# Patient Record
Sex: FEMALE | Race: WHITE | NOT HISPANIC OR LATINO | Employment: FULL TIME | URBAN - METROPOLITAN AREA
[De-identification: names, ages, dates, MRNs, and addresses within clinical notes are randomized per-mention and may not be internally consistent; named-entity substitution may affect disease eponyms.]

---

## 2017-02-24 ENCOUNTER — HOSPITAL ENCOUNTER (OUTPATIENT)
Dept: OTHER | Facility: HOSPITAL | Age: 62
Setting detail: SPECIMEN
Discharge: HOME | End: 2017-02-24
Attending: RADIOLOGY | Admitting: RADIOLOGY

## 2017-12-14 ENCOUNTER — ON CAMPUS - OUTPATIENT (AMBULATORY)
Dept: URBAN - METROPOLITAN AREA HOSPITAL 2 | Facility: HOSPITAL | Age: 62
End: 2017-12-14

## 2017-12-14 ENCOUNTER — OFFICE (AMBULATORY)
Dept: URBAN - METROPOLITAN AREA CLINIC 64 | Facility: CLINIC | Age: 62
End: 2017-12-14

## 2017-12-14 VITALS
SYSTOLIC BLOOD PRESSURE: 149 MMHG | HEART RATE: 81 BPM | HEART RATE: 93 BPM | HEART RATE: 80 BPM | DIASTOLIC BLOOD PRESSURE: 87 MMHG | SYSTOLIC BLOOD PRESSURE: 154 MMHG | DIASTOLIC BLOOD PRESSURE: 107 MMHG | DIASTOLIC BLOOD PRESSURE: 75 MMHG | DIASTOLIC BLOOD PRESSURE: 69 MMHG | OXYGEN SATURATION: 100 % | SYSTOLIC BLOOD PRESSURE: 173 MMHG | DIASTOLIC BLOOD PRESSURE: 46 MMHG | HEART RATE: 94 BPM | TEMPERATURE: 97.5 F | HEART RATE: 77 BPM | OXYGEN SATURATION: 96 % | RESPIRATION RATE: 16 BRPM | DIASTOLIC BLOOD PRESSURE: 68 MMHG | OXYGEN SATURATION: 98 % | RESPIRATION RATE: 19 BRPM | SYSTOLIC BLOOD PRESSURE: 145 MMHG | HEART RATE: 99 BPM | HEART RATE: 92 BPM | RESPIRATION RATE: 15 BRPM | SYSTOLIC BLOOD PRESSURE: 148 MMHG | OXYGEN SATURATION: 99 % | RESPIRATION RATE: 17 BRPM | DIASTOLIC BLOOD PRESSURE: 94 MMHG | RESPIRATION RATE: 18 BRPM | SYSTOLIC BLOOD PRESSURE: 161 MMHG | WEIGHT: 222 LBS | SYSTOLIC BLOOD PRESSURE: 146 MMHG | HEART RATE: 104 BPM

## 2017-12-14 DIAGNOSIS — K31.7 POLYP OF STOMACH AND DUODENUM: ICD-10-CM

## 2017-12-14 DIAGNOSIS — K92.0 HEMATEMESIS: ICD-10-CM

## 2017-12-14 PROBLEM — K59.1 DIARRHEA: Status: ACTIVE | Noted: 2017-12-14

## 2017-12-14 LAB
GI HISTOLOGY: A. UNSPECIFIED: (no result)
GI HISTOLOGY: B. SELECT: (no result)
GI HISTOLOGY: PDF REPORT: (no result)

## 2017-12-14 PROCEDURE — 88305 TISSUE EXAM BY PATHOLOGIST: CPT | Performed by: INTERNAL MEDICINE

## 2017-12-14 PROCEDURE — 43250 EGD CAUTERY TUMOR POLYP: CPT | Performed by: INTERNAL MEDICINE

## 2017-12-14 PROCEDURE — 43239 EGD BIOPSY SINGLE/MULTIPLE: CPT | Mod: 59 | Performed by: INTERNAL MEDICINE

## 2017-12-14 RX ADMIN — PROPOFOL: 10 INJECTION, EMULSION INTRAVENOUS at 15:09

## 2017-12-14 NOTE — SERVICEHPINOTES
CLAU UNDERWOOD  is a  62  female   who presents today for a  EGD   for   the indications listed below. The updated Patient Profile was reviewed prior to the procedure, in conjunction with the Physical Exam, including medical conditions, surgical procedures, medications, allergies, family history and social history. See Physical Exam time stamp below for date and time of HPI completion.Pre-operatively, I reviewed the indication(s) for the procedure, the risks of the procedure [including but not limited to: unexpected bleeding possibly requiring hospitalization and/or unplanned repeat procedures, perforation possibly requiring surgical treatment, missed lesions and complications of sedation/MAC (also explained by anesthesia staff)]. I have evaluated the patient for risks associated with the planned anesthesia and the procedure to be performed and find the patient an acceptable candidate for IV sedation.Multiple opportunities were provided for any questions or concerns, and all questions were answered satisfactorily before any anesthesia was administered. We will proceed with the planned procedure. BRa few weeks ago, had multiple episodes of hematemesis with with multiple bouts of vomiting. Has had chronic diarrhea for many months. BR

## 2017-12-14 NOTE — SERVICENOTES
bleeding was likely Rosetta wise tear from vomiting. Acute Vomiting episodes resolved and defect likely healed. Continue PPI.

## 2020-07-26 PROCEDURE — U0003 INFECTIOUS AGENT DETECTION BY NUCLEIC ACID (DNA OR RNA); SEVERE ACUTE RESPIRATORY SYNDROME CORONAVIRUS 2 (SARS-COV-2) (CORONAVIRUS DISEASE [COVID-19]), AMPLIFIED PROBE TECHNIQUE, MAKING USE OF HIGH THROUGHPUT TECHNOLOGIES AS DESCRIBED BY CMS-2020-01-R: HCPCS

## 2020-07-30 ENCOUNTER — TELEPHONE (OUTPATIENT)
Dept: URGENT CARE | Facility: CLINIC | Age: 65
End: 2020-07-30

## 2020-09-21 ENCOUNTER — OFFICE VISIT (OUTPATIENT)
Dept: SURGERY | Facility: CLINIC | Age: 65
End: 2020-09-21

## 2020-09-21 ENCOUNTER — PREP FOR SURGERY (OUTPATIENT)
Dept: OTHER | Facility: HOSPITAL | Age: 65
End: 2020-09-21

## 2020-09-21 VITALS
DIASTOLIC BLOOD PRESSURE: 70 MMHG | OXYGEN SATURATION: 97 % | HEIGHT: 60 IN | WEIGHT: 214 LBS | HEART RATE: 66 BPM | BODY MASS INDEX: 42.01 KG/M2 | TEMPERATURE: 97.3 F | SYSTOLIC BLOOD PRESSURE: 138 MMHG

## 2020-09-21 DIAGNOSIS — D17.1 LIPOMA OF BACK: Primary | ICD-10-CM

## 2020-09-21 PROCEDURE — 99203 OFFICE O/P NEW LOW 30 MIN: CPT | Performed by: SURGERY

## 2020-09-21 RX ORDER — SERTRALINE HYDROCHLORIDE 100 MG/1
100 TABLET, FILM COATED ORAL DAILY
COMMUNITY
Start: 2020-08-31

## 2020-09-21 RX ORDER — ATORVASTATIN CALCIUM 20 MG/1
20 TABLET, FILM COATED ORAL DAILY
COMMUNITY
Start: 2020-07-16

## 2020-09-21 RX ORDER — TRAMADOL HYDROCHLORIDE 50 MG/1
50 TABLET ORAL EVERY 8 HOURS PRN
COMMUNITY
Start: 2020-07-13

## 2020-09-21 RX ORDER — MELOXICAM 15 MG/1
15 TABLET ORAL DAILY
COMMUNITY
Start: 2020-09-10

## 2020-09-21 RX ORDER — DICYCLOMINE HYDROCHLORIDE 10 MG/1
10 CAPSULE ORAL NIGHTLY
COMMUNITY
Start: 2020-09-14

## 2020-09-21 NOTE — PROGRESS NOTES
"Subjective   Carito Anderson is a 65 y.o. female.   Chief Complaint   Patient presents with   • Mass     right shoulder     /70   Pulse 66   Temp 97.3 °F (36.3 °C) (Temporal)   Ht 152.4 cm (60\")   Wt 97.1 kg (214 lb)   SpO2 97%   BMI 41.79 kg/m²     HISTORY OF PRESENT ILLNESS:  Patient is a very pleasant 65-year-old female.  She noticed a new 2 and half centimeter lump on her right upper back near her shoulder that is quite tender.  She is not sure how long it is been there but she only noticed it when it was uncomfortable.  Is never had any lump like this before.  She does also report some weakness on her right side and was wondering if this was attributable.      The following portions of the patient's history were reviewed and updated as appropriate: allergies, current medications, past family history, past medical history, past social history, past surgical history and problem list.    Review of Systems   Musculoskeletal:        Tender nodule right upper back       Objective   Physical Exam  Constitutional:       Appearance: She is well-developed.   HENT:      Head: Normocephalic and atraumatic.   Neck:      Musculoskeletal: Normal range of motion.   Cardiovascular:      Rate and Rhythm: Normal rate.   Pulmonary:      Effort: Pulmonary effort is normal.   Abdominal:      Palpations: Abdomen is soft.   Musculoskeletal: Normal range of motion.      Comments: 2.5 cm palpable mass consistent with a lipoma right upper back near her shoulder   Skin:     General: Skin is warm and dry.   Neurological:      Mental Status: She is alert and oriented to person, place, and time.           Assessment/Plan   Carito was seen today for mass.    Diagnoses and all orders for this visit:    Lipoma of back    I do think she would benefit from excision secondary to the discomfort.  I think this can be done under MAC anesthesia would not require general.  We have discussed the risk of anesthesia bleeding and infection.  I " also told her that although this would result in localized tenderness that any weakness or other symptoms she is experiencing would not be attributable to this.    Beba Kat MD  9/21/2020  12:08 PM EDT

## 2020-09-21 NOTE — H&P
Subjective   Carito Anderson is a 65 y.o. female.   No chief complaint on file.    There were no vitals taken for this visit.    HISTORY OF PRESENT ILLNESS:  Patient is a very pleasant 65-year-old female.  She noticed a new 2 and half centimeter lump on her right upper back near her shoulder that is quite tender.  She is not sure how long it is been there but she only noticed it when it was uncomfortable.  Is never had any lump like this before.  She does also report some weakness on her right side and was wondering if this was attributable.      The following portions of the patient's history were reviewed and updated as appropriate: allergies, current medications, past family history, past medical history, past social history, past surgical history and problem list.    Review of Systems   Musculoskeletal:        Tender nodule right upper back       Objective   Physical Exam  Constitutional:       Appearance: She is well-developed.   HENT:      Head: Normocephalic and atraumatic.   Neck:      Musculoskeletal: Normal range of motion.   Cardiovascular:      Rate and Rhythm: Normal rate.   Pulmonary:      Effort: Pulmonary effort is normal.   Abdominal:      Palpations: Abdomen is soft.   Musculoskeletal: Normal range of motion.      Comments: 2.5 cm palpable mass consistent with a lipoma right upper back near her shoulder   Skin:     General: Skin is warm and dry.   Neurological:      Mental Status: She is alert and oriented to person, place, and time.           Assessment/Plan   There are no diagnoses linked to this encounter.I do think she would benefit from excision secondary to the discomfort.  I think this can be done under MAC anesthesia would not require general.  We have discussed the risk of anesthesia bleeding and infection.  I also told her that although this would result in localized tenderness that any weakness or other symptoms she is experiencing would not be attributable to this.    Beba Kat,  MD  9/21/2020  12:08 PM EDT

## 2020-09-28 ENCOUNTER — LAB (OUTPATIENT)
Dept: LAB | Facility: HOSPITAL | Age: 65
End: 2020-09-28

## 2020-09-28 ENCOUNTER — HOSPITAL ENCOUNTER (OUTPATIENT)
Dept: CARDIOLOGY | Facility: HOSPITAL | Age: 65
Discharge: HOME | End: 2020-09-28

## 2020-09-28 DIAGNOSIS — D17.1 LIPOMA OF BACK: ICD-10-CM

## 2020-09-28 LAB
DEPRECATED RDW RBC AUTO: 40.6 FL (ref 37–54)
ERYTHROCYTE [DISTWIDTH] IN BLOOD BY AUTOMATED COUNT: 13.5 % (ref 12.3–15.4)
HCT VFR BLD AUTO: 41 % (ref 34–46.6)
HGB BLD-MCNC: 13.4 G/DL (ref 12–15.9)
MCH RBC QN AUTO: 27.2 PG (ref 26.6–33)
MCHC RBC AUTO-ENTMCNC: 32.7 G/DL (ref 31.5–35.7)
MCV RBC AUTO: 83.3 FL (ref 79–97)
PLATELET # BLD AUTO: 252 10*3/MM3 (ref 140–450)
PMV BLD AUTO: 10.8 FL (ref 6–12)
RBC # BLD AUTO: 4.92 10*6/MM3 (ref 3.77–5.28)
WBC # BLD AUTO: 8.01 10*3/MM3 (ref 3.4–10.8)

## 2020-09-28 PROCEDURE — 85027 COMPLETE CBC AUTOMATED: CPT

## 2020-09-28 PROCEDURE — 93005 ELECTROCARDIOGRAM TRACING: CPT | Performed by: SURGERY

## 2020-09-28 PROCEDURE — 93010 ELECTROCARDIOGRAM REPORT: CPT | Performed by: INTERNAL MEDICINE

## 2020-12-30 ENCOUNTER — OFFICE VISIT (OUTPATIENT)
Dept: SURGERY | Facility: CLINIC | Age: 65
End: 2020-12-30

## 2020-12-30 ENCOUNTER — PREP FOR SURGERY (OUTPATIENT)
Dept: OTHER | Facility: HOSPITAL | Age: 65
End: 2020-12-30

## 2020-12-30 VITALS
HEART RATE: 80 BPM | HEIGHT: 60 IN | BODY MASS INDEX: 42.41 KG/M2 | WEIGHT: 216 LBS | OXYGEN SATURATION: 96 % | TEMPERATURE: 96.8 F

## 2020-12-30 DIAGNOSIS — D17.1 LIPOMA OF BACK: Primary | ICD-10-CM

## 2020-12-30 PROBLEM — K21.9 GASTROESOPHAGEAL REFLUX DISEASE: Status: ACTIVE | Noted: 2017-10-30

## 2020-12-30 PROBLEM — I10 HYPERTENSION: Status: ACTIVE | Noted: 2020-12-30

## 2020-12-30 PROCEDURE — 99213 OFFICE O/P EST LOW 20 MIN: CPT | Performed by: SURGERY

## 2020-12-30 RX ORDER — SODIUM CHLORIDE 9 MG/ML
100 INJECTION, SOLUTION INTRAVENOUS CONTINUOUS
Status: CANCELLED | OUTPATIENT
Start: 2020-12-30

## 2020-12-30 NOTE — H&P
Subjective   Carito Anderson is a 65 y.o. female.     History of present illness  Ms. Anderson is a pleasant 65-year-old female patient who was originally scheduled to have a lipoma removed by Dr. Valente.  Dr. Valente being gone been asked to see her.  She has about a 2-1/2 to 3 cm subcutaneous lipoma in the right upper back that is tender.  Is likely an angiolipoma.  We have discussed removal of that with her today.  She would like to proceed.  She is having some discomfort in the upper shoulder and it may even be pulling on the spinal accessory nerve.        Past Medical History:   Diagnosis Date   • Anxiety    • Arthritis    • Bowel incontinence    • Depression    • GERD (gastroesophageal reflux disease)    • Hx of migraines    • Hyperlipidemia    • Leg cramps    • Memory deficit        Past Surgical History:   Procedure Laterality Date   • APPENDECTOMY     • CHOLECYSTECTOMY     • HYSTERECTOMY     • KNEE SURGERY Right     arthritis   • ROTATOR CUFF REPAIR Right        Outpatient Encounter Medications as of 12/30/2020   Medication Sig Dispense Refill   • atorvastatin (LIPITOR) 20 MG tablet Take 20 mg by mouth Daily. Ok to take preop     • dicyclomine (BENTYL) 10 MG capsule Take 10 mg by mouth Daily. dont take preop     • meloxicam (MOBIC) 15 MG tablet Take 15 mg by mouth Daily. Last dose 9/28/20     • omeprazole (priLOSEC) 20 MG capsule Take 20 mg by mouth Daily. Take preop     • sertraline (ZOLOFT) 100 MG tablet Take 100 mg by mouth Daily. Take preop     • sertraline (ZOLOFT) 25 MG tablet Take 25 mg by mouth Daily.     • traMADol (ULTRAM) 50 MG tablet Take 50 mg by mouth Every 8 (Eight) Hours As Needed. Take preop op       No facility-administered encounter medications on file as of 12/30/2020.        No Known Allergies    No family history on file.    Social History     Socioeconomic History   • Marital status:      Spouse name: Not on file   • Number of children: Not on file   • Years of education: Not  on file   • Highest education level: Not on file   Tobacco Use   • Smoking status: Never Smoker   • Smokeless tobacco: Never Used   Substance and Sexual Activity   • Alcohol use: Not Currently   • Drug use: Never   • Sexual activity: Defer       The following portions of the patient's history were reviewed and updated as appropriate: allergies, current medications, past family history, past medical history, past social history, past surgical history and problem list.    Objective   Complete review of systems is done and unremarkable exception the chief complaint    Physical exam shows a pleasant obese 65-year-old female.  HEENT is negative.  Heart regular.  Lungs are clear.  Abdomen is soft obese nontender extremities show equal range of motion in the upper and lower extremities.  She has symmetrical strength and usage.  Neuro shows no obvious focal deficit.    She does have in the right upper back about 4 inches lateral to the midline a 2-1/2 x 3 cm subcutaneous mass consistent with a lipoma.    Impression: Painful lipoma right upper back    Plan: Surgical excision in the operating room    Assessment/Plan   There are no diagnoses linked to this encounter.               Kayden Goldsmith DO  12/30/2020  13:55 EST

## 2020-12-30 NOTE — PROGRESS NOTES
Subjective   Carito Anderson is a 65 y.o. female.     History of present illness  Ms. Anderson is a pleasant 65-year-old female patient who was originally scheduled to have a lipoma removed by Dr. Valente.  Dr. Valente being gone been asked to see her.  She has about a 2-1/2 to 3 cm subcutaneous lipoma in the right upper back that is tender.  Is likely an angiolipoma.  We have discussed removal of that with her today.  She would like to proceed.  She is having some discomfort in the upper shoulder and it may even be pulling on the spinal accessory nerve.        Past Medical History:   Diagnosis Date   • Anxiety    • Arthritis    • Bowel incontinence    • Depression    • GERD (gastroesophageal reflux disease)    • Hx of migraines    • Hyperlipidemia    • Leg cramps    • Memory deficit        Past Surgical History:   Procedure Laterality Date   • APPENDECTOMY     • CHOLECYSTECTOMY     • HYSTERECTOMY     • KNEE SURGERY Right     arthritis   • ROTATOR CUFF REPAIR Right        Outpatient Encounter Medications as of 12/30/2020   Medication Sig Dispense Refill   • atorvastatin (LIPITOR) 20 MG tablet Take 20 mg by mouth Daily. Ok to take preop     • dicyclomine (BENTYL) 10 MG capsule Take 10 mg by mouth Daily. dont take preop     • meloxicam (MOBIC) 15 MG tablet Take 15 mg by mouth Daily. Last dose 9/28/20     • omeprazole (priLOSEC) 20 MG capsule Take 20 mg by mouth Daily. Take preop     • sertraline (ZOLOFT) 100 MG tablet Take 100 mg by mouth Daily. Take preop     • sertraline (ZOLOFT) 25 MG tablet Take 25 mg by mouth Daily.     • traMADol (ULTRAM) 50 MG tablet Take 50 mg by mouth Every 8 (Eight) Hours As Needed. Take preop op       No facility-administered encounter medications on file as of 12/30/2020.        No Known Allergies    No family history on file.    Social History     Socioeconomic History   • Marital status:      Spouse name: Not on file   • Number of children: Not on file   • Years of education: Not  on file   • Highest education level: Not on file   Tobacco Use   • Smoking status: Never Smoker   • Smokeless tobacco: Never Used   Substance and Sexual Activity   • Alcohol use: Not Currently   • Drug use: Never   • Sexual activity: Defer       The following portions of the patient's history were reviewed and updated as appropriate: allergies, current medications, past family history, past medical history, past social history, past surgical history and problem list.    Objective   Complete review of systems is done and unremarkable exception the chief complaint    Physical exam shows a pleasant obese 65-year-old female.  HEENT is negative.  Heart regular.  Lungs are clear.  Abdomen is soft obese nontender extremities show equal range of motion in the upper and lower extremities.  She has symmetrical strength and usage.  Neuro shows no obvious focal deficit.    She does have in the right upper back about 4 inches lateral to the midline a 2-1/2 x 3 cm subcutaneous mass consistent with a lipoma.    Impression: Painful lipoma right upper back    Plan: Surgical excision in the operating room    Assessment/Plan   There are no diagnoses linked to this encounter.               Kayden Goldsmith DO  12/30/2020  13:53 EST

## 2021-01-22 ENCOUNTER — LAB (OUTPATIENT)
Dept: LAB | Facility: HOSPITAL | Age: 66
End: 2021-01-22

## 2021-01-22 DIAGNOSIS — D17.1 LIPOMA OF BACK: ICD-10-CM

## 2021-01-22 LAB
ALBUMIN SERPL-MCNC: 3.6 G/DL (ref 3.5–5.2)
ALBUMIN/GLOB SERPL: 1 G/DL
ALP SERPL-CCNC: 129 U/L (ref 39–117)
ALT SERPL W P-5'-P-CCNC: 10 U/L (ref 1–33)
ANION GAP SERPL CALCULATED.3IONS-SCNC: 6.5 MMOL/L (ref 5–15)
AST SERPL-CCNC: 16 U/L (ref 1–32)
BASOPHILS # BLD AUTO: 0.05 10*3/MM3 (ref 0–0.2)
BASOPHILS NFR BLD AUTO: 0.8 % (ref 0–1.5)
BILIRUB SERPL-MCNC: 0.3 MG/DL (ref 0–1.2)
BUN SERPL-MCNC: 14 MG/DL (ref 8–23)
BUN/CREAT SERPL: 14 (ref 7–25)
CALCIUM SPEC-SCNC: 9.4 MG/DL (ref 8.6–10.5)
CHLORIDE SERPL-SCNC: 103 MMOL/L (ref 98–107)
CO2 SERPL-SCNC: 28.5 MMOL/L (ref 22–29)
CREAT SERPL-MCNC: 1 MG/DL (ref 0.57–1)
DEPRECATED RDW RBC AUTO: 42.6 FL (ref 37–54)
EOSINOPHIL # BLD AUTO: 0.16 10*3/MM3 (ref 0–0.4)
EOSINOPHIL NFR BLD AUTO: 2.4 % (ref 0.3–6.2)
ERYTHROCYTE [DISTWIDTH] IN BLOOD BY AUTOMATED COUNT: 14.3 % (ref 12.3–15.4)
GFR SERPL CREATININE-BSD FRML MDRD: 56 ML/MIN/1.73
GLOBULIN UR ELPH-MCNC: 3.5 GM/DL
GLUCOSE SERPL-MCNC: 121 MG/DL (ref 65–99)
HCT VFR BLD AUTO: 39.4 % (ref 34–46.6)
HGB BLD-MCNC: 12.8 G/DL (ref 12–15.9)
IMM GRANULOCYTES # BLD AUTO: 0.04 10*3/MM3 (ref 0–0.05)
IMM GRANULOCYTES NFR BLD AUTO: 0.6 % (ref 0–0.5)
LYMPHOCYTES # BLD AUTO: 1.49 10*3/MM3 (ref 0.7–3.1)
LYMPHOCYTES NFR BLD AUTO: 22.7 % (ref 19.6–45.3)
MCH RBC QN AUTO: 26.8 PG (ref 26.6–33)
MCHC RBC AUTO-ENTMCNC: 32.5 G/DL (ref 31.5–35.7)
MCV RBC AUTO: 82.4 FL (ref 79–97)
MONOCYTES # BLD AUTO: 0.65 10*3/MM3 (ref 0.1–0.9)
MONOCYTES NFR BLD AUTO: 9.9 % (ref 5–12)
NEUTROPHILS NFR BLD AUTO: 4.17 10*3/MM3 (ref 1.7–7)
NEUTROPHILS NFR BLD AUTO: 63.6 % (ref 42.7–76)
NRBC BLD AUTO-RTO: 0 /100 WBC (ref 0–0.2)
PLATELET # BLD AUTO: 207 10*3/MM3 (ref 140–450)
PMV BLD AUTO: 10.2 FL (ref 6–12)
POTASSIUM SERPL-SCNC: 4.5 MMOL/L (ref 3.5–5.2)
PROT SERPL-MCNC: 7.1 G/DL (ref 6–8.5)
RBC # BLD AUTO: 4.78 10*6/MM3 (ref 3.77–5.28)
SARS-COV-2 ORF1AB RESP QL NAA+PROBE: NOT DETECTED
SODIUM SERPL-SCNC: 138 MMOL/L (ref 136–145)
WBC # BLD AUTO: 6.56 10*3/MM3 (ref 3.4–10.8)

## 2021-01-22 PROCEDURE — 80053 COMPREHEN METABOLIC PANEL: CPT

## 2021-01-22 PROCEDURE — 85025 COMPLETE CBC W/AUTO DIFF WBC: CPT

## 2021-01-22 PROCEDURE — C9803 HOPD COVID-19 SPEC COLLECT: HCPCS

## 2021-01-22 PROCEDURE — U0004 COV-19 TEST NON-CDC HGH THRU: HCPCS

## 2021-01-22 PROCEDURE — 36415 COLL VENOUS BLD VENIPUNCTURE: CPT

## 2021-01-25 ENCOUNTER — ANESTHESIA EVENT (OUTPATIENT)
Dept: PERIOP | Facility: HOSPITAL | Age: 66
End: 2021-01-25

## 2021-01-25 ENCOUNTER — ANESTHESIA (OUTPATIENT)
Dept: PERIOP | Facility: HOSPITAL | Age: 66
End: 2021-01-25

## 2021-01-25 ENCOUNTER — HOSPITAL ENCOUNTER (OUTPATIENT)
Facility: HOSPITAL | Age: 66
Setting detail: HOSPITAL OUTPATIENT SURGERY
Discharge: HOME | End: 2021-01-25
Attending: SURGERY | Admitting: SURGERY

## 2021-01-25 VITALS
HEART RATE: 79 BPM | TEMPERATURE: 98.4 F | SYSTOLIC BLOOD PRESSURE: 132 MMHG | OXYGEN SATURATION: 95 % | WEIGHT: 216.6 LBS | BODY MASS INDEX: 42.53 KG/M2 | HEIGHT: 60 IN | RESPIRATION RATE: 16 BRPM | DIASTOLIC BLOOD PRESSURE: 72 MMHG

## 2021-01-25 DIAGNOSIS — D17.1 LIPOMA OF BACK: ICD-10-CM

## 2021-01-25 PROCEDURE — 88304 TISSUE EXAM BY PATHOLOGIST: CPT | Performed by: SURGERY

## 2021-01-25 PROCEDURE — 25010000002 HYDROMORPHONE PER 4 MG: Performed by: NURSE ANESTHETIST, CERTIFIED REGISTERED

## 2021-01-25 PROCEDURE — 25010000002 PROPOFOL 10 MG/ML EMULSION: Performed by: NURSE ANESTHETIST, CERTIFIED REGISTERED

## 2021-01-25 PROCEDURE — 25010000002 DEXAMETHASONE PER 1 MG: Performed by: NURSE ANESTHETIST, CERTIFIED REGISTERED

## 2021-01-25 PROCEDURE — 25010000002 ONDANSETRON PER 1 MG: Performed by: NURSE ANESTHETIST, CERTIFIED REGISTERED

## 2021-01-25 PROCEDURE — 25010000002 FENTANYL CITRATE (PF) 100 MCG/2ML SOLUTION: Performed by: NURSE ANESTHETIST, CERTIFIED REGISTERED

## 2021-01-25 PROCEDURE — 25010000002 MIDAZOLAM PER 1 MG: Performed by: NURSE ANESTHETIST, CERTIFIED REGISTERED

## 2021-01-25 PROCEDURE — 21555 EXC NECK LES SC < 3 CM: CPT | Performed by: SURGERY

## 2021-01-25 RX ORDER — ONDANSETRON 2 MG/ML
INJECTION INTRAMUSCULAR; INTRAVENOUS AS NEEDED
Status: DISCONTINUED | OUTPATIENT
Start: 2021-01-25 | End: 2021-01-25 | Stop reason: SURG

## 2021-01-25 RX ORDER — GLYCOPYRROLATE 1 MG/5 ML
SYRINGE (ML) INTRAVENOUS AS NEEDED
Status: DISCONTINUED | OUTPATIENT
Start: 2021-01-25 | End: 2021-01-25 | Stop reason: SURG

## 2021-01-25 RX ORDER — HYDROCODONE BITARTRATE AND ACETAMINOPHEN 7.5; 325 MG/1; MG/1
1 TABLET ORAL EVERY 6 HOURS PRN
Qty: 30 TABLET | Refills: 0 | Status: SHIPPED | OUTPATIENT
Start: 2021-01-25

## 2021-01-25 RX ORDER — NEOSTIGMINE METHYLSULFATE 5 MG/5 ML
SYRINGE (ML) INTRAVENOUS AS NEEDED
Status: DISCONTINUED | OUTPATIENT
Start: 2021-01-25 | End: 2021-01-25 | Stop reason: SURG

## 2021-01-25 RX ORDER — HYDROMORPHONE HCL 110MG/55ML
0.2 PATIENT CONTROLLED ANALGESIA SYRINGE INTRAVENOUS
Status: DISCONTINUED | OUTPATIENT
Start: 2021-01-25 | End: 2021-01-25 | Stop reason: HOSPADM

## 2021-01-25 RX ORDER — HYDRALAZINE HYDROCHLORIDE 20 MG/ML
5 INJECTION INTRAMUSCULAR; INTRAVENOUS
Status: DISCONTINUED | OUTPATIENT
Start: 2021-01-25 | End: 2021-01-25 | Stop reason: HOSPADM

## 2021-01-25 RX ORDER — SODIUM CHLORIDE, SODIUM LACTATE, POTASSIUM CHLORIDE, CALCIUM CHLORIDE 600; 310; 30; 20 MG/100ML; MG/100ML; MG/100ML; MG/100ML
1000 INJECTION, SOLUTION INTRAVENOUS CONTINUOUS
Status: DISCONTINUED | OUTPATIENT
Start: 2021-01-25 | End: 2021-01-25 | Stop reason: HOSPADM

## 2021-01-25 RX ORDER — LIDOCAINE HYDROCHLORIDE 20 MG/ML
INJECTION, SOLUTION EPIDURAL; INFILTRATION; INTRACAUDAL; PERINEURAL AS NEEDED
Status: DISCONTINUED | OUTPATIENT
Start: 2021-01-25 | End: 2021-01-25 | Stop reason: SURG

## 2021-01-25 RX ORDER — SODIUM CHLORIDE 9 MG/ML
100 INJECTION, SOLUTION INTRAVENOUS CONTINUOUS
Status: DISCONTINUED | OUTPATIENT
Start: 2021-01-25 | End: 2021-01-25 | Stop reason: HOSPADM

## 2021-01-25 RX ORDER — PROMETHAZINE HYDROCHLORIDE 25 MG/1
25 TABLET ORAL ONCE AS NEEDED
Status: DISCONTINUED | OUTPATIENT
Start: 2021-01-25 | End: 2021-01-25 | Stop reason: HOSPADM

## 2021-01-25 RX ORDER — DEXAMETHASONE SODIUM PHOSPHATE 4 MG/ML
INJECTION, SOLUTION INTRA-ARTICULAR; INTRALESIONAL; INTRAMUSCULAR; INTRAVENOUS; SOFT TISSUE AS NEEDED
Status: DISCONTINUED | OUTPATIENT
Start: 2021-01-25 | End: 2021-01-25 | Stop reason: SURG

## 2021-01-25 RX ORDER — HYDROCODONE BITARTRATE AND ACETAMINOPHEN 7.5; 325 MG/1; MG/1
1 TABLET ORAL ONCE
Status: COMPLETED | OUTPATIENT
Start: 2021-01-25 | End: 2021-01-25

## 2021-01-25 RX ORDER — ONDANSETRON 2 MG/ML
4 INJECTION INTRAMUSCULAR; INTRAVENOUS ONCE AS NEEDED
Status: DISCONTINUED | OUTPATIENT
Start: 2021-01-25 | End: 2021-01-25 | Stop reason: HOSPADM

## 2021-01-25 RX ORDER — PROPOFOL 10 MG/ML
VIAL (ML) INTRAVENOUS AS NEEDED
Status: DISCONTINUED | OUTPATIENT
Start: 2021-01-25 | End: 2021-01-25 | Stop reason: SURG

## 2021-01-25 RX ORDER — FENTANYL CITRATE 50 UG/ML
INJECTION, SOLUTION INTRAMUSCULAR; INTRAVENOUS AS NEEDED
Status: DISCONTINUED | OUTPATIENT
Start: 2021-01-25 | End: 2021-01-25 | Stop reason: SURG

## 2021-01-25 RX ORDER — SODIUM CHLORIDE 0.9 % (FLUSH) 0.9 %
10 SYRINGE (ML) INJECTION AS NEEDED
Status: DISCONTINUED | OUTPATIENT
Start: 2021-01-25 | End: 2021-01-25 | Stop reason: HOSPADM

## 2021-01-25 RX ORDER — MIDAZOLAM HYDROCHLORIDE 1 MG/ML
INJECTION INTRAMUSCULAR; INTRAVENOUS AS NEEDED
Status: DISCONTINUED | OUTPATIENT
Start: 2021-01-25 | End: 2021-01-25 | Stop reason: SURG

## 2021-01-25 RX ORDER — PROMETHAZINE HYDROCHLORIDE 25 MG/1
25 SUPPOSITORY RECTAL ONCE AS NEEDED
Status: DISCONTINUED | OUTPATIENT
Start: 2021-01-25 | End: 2021-01-25 | Stop reason: HOSPADM

## 2021-01-25 RX ORDER — ROCURONIUM BROMIDE 10 MG/ML
INJECTION, SOLUTION INTRAVENOUS AS NEEDED
Status: DISCONTINUED | OUTPATIENT
Start: 2021-01-25 | End: 2021-01-25 | Stop reason: SURG

## 2021-01-25 RX ORDER — PHENYLEPHRINE HCL IN 0.9% NACL 0.5 MG/5ML
SYRINGE (ML) INTRAVENOUS AS NEEDED
Status: DISCONTINUED | OUTPATIENT
Start: 2021-01-25 | End: 2021-01-25 | Stop reason: SURG

## 2021-01-25 RX ORDER — HYDROMORPHONE HCL 110MG/55ML
0.5 PATIENT CONTROLLED ANALGESIA SYRINGE INTRAVENOUS
Status: DISCONTINUED | OUTPATIENT
Start: 2021-01-25 | End: 2021-01-25 | Stop reason: HOSPADM

## 2021-01-25 RX ORDER — BUPIVACAINE HYDROCHLORIDE 5 MG/ML
INJECTION, SOLUTION PERINEURAL AS NEEDED
Status: DISCONTINUED | OUTPATIENT
Start: 2021-01-25 | End: 2021-01-25 | Stop reason: HOSPADM

## 2021-01-25 RX ORDER — LIDOCAINE HYDROCHLORIDE 10 MG/ML
0.5 INJECTION, SOLUTION INFILTRATION; PERINEURAL ONCE AS NEEDED
Status: DISCONTINUED | OUTPATIENT
Start: 2021-01-25 | End: 2021-01-25 | Stop reason: HOSPADM

## 2021-01-25 RX ADMIN — SUGAMMADEX 200 MG: 100 INJECTION, SOLUTION INTRAVENOUS at 09:44

## 2021-01-25 RX ADMIN — SODIUM CHLORIDE, POTASSIUM CHLORIDE, SODIUM LACTATE AND CALCIUM CHLORIDE 1000 ML: 600; 310; 30; 20 INJECTION, SOLUTION INTRAVENOUS at 07:07

## 2021-01-25 RX ADMIN — FENTANYL CITRATE 100 MCG: 50 INJECTION, SOLUTION INTRAMUSCULAR; INTRAVENOUS at 09:09

## 2021-01-25 RX ADMIN — ROCURONIUM BROMIDE 30 MG: 10 INJECTION, SOLUTION INTRAVENOUS at 09:09

## 2021-01-25 RX ADMIN — PHENYLEPHRINE HYDROCHLORIDE 100 MCG: 10 INJECTION INTRAVENOUS at 09:09

## 2021-01-25 RX ADMIN — MIDAZOLAM 2 MG: 1 INJECTION INTRAMUSCULAR; INTRAVENOUS at 09:09

## 2021-01-25 RX ADMIN — HYDROCODONE BITARTRATE AND ACETAMINOPHEN 1 TABLET: 7.5; 325 TABLET ORAL at 10:18

## 2021-01-25 RX ADMIN — Medication 3 MG: at 09:28

## 2021-01-25 RX ADMIN — DEXAMETHASONE SODIUM PHOSPHATE 4 MG: 4 INJECTION, SOLUTION INTRAMUSCULAR; INTRAVENOUS at 09:09

## 2021-01-25 RX ADMIN — PROPOFOL 200 MG: 10 INJECTION, EMULSION INTRAVENOUS at 09:09

## 2021-01-25 RX ADMIN — Medication 0.6 MG: at 09:28

## 2021-01-25 RX ADMIN — ONDANSETRON 4 MG: 2 INJECTION INTRAMUSCULAR; INTRAVENOUS at 09:09

## 2021-01-25 RX ADMIN — LIDOCAINE HYDROCHLORIDE 50 MG: 20 INJECTION, SOLUTION EPIDURAL; INFILTRATION; INTRACAUDAL; PERINEURAL at 09:09

## 2021-01-25 RX ADMIN — CEFAZOLIN SODIUM 2 G: 1 INJECTION, POWDER, FOR SOLUTION INTRAMUSCULAR; INTRAVENOUS at 09:14

## 2021-01-25 RX ADMIN — HYDROMORPHONE HYDROCHLORIDE 0.25 MG: 2 INJECTION, SOLUTION INTRAMUSCULAR; INTRAVENOUS; SUBCUTANEOUS at 10:21

## 2021-01-25 NOTE — ANESTHESIA POSTPROCEDURE EVALUATION
Patient: Carito Anderson    Procedure Summary     Date: 01/25/21 Room / Location: [facility] OR 08 / [facility] MAIN OR    Anesthesia Start: 0904 Anesthesia Stop: 0945    Procedure: MASS SOFT TISSUE EXCISION (Right ) Diagnosis:       Lipoma of back      (Lipoma of back [D17.1])    Surgeon: Kayden Goldsmith DO Provider: Olivier Fulton MD    Anesthesia Type: general ASA Status: 3          Anesthesia Type: general    Vitals  Vitals Value Taken Time   /54 01/25/21 1049   Temp 98.5 °F (36.9 °C) 01/25/21 1047   Pulse 73 01/25/21 1052   Resp 15 01/25/21 1047   SpO2 88 % 01/25/21 1052   Vitals shown include unvalidated device data.        Post Anesthesia Care and Evaluation    Patient location during evaluation: PACU  Patient participation: complete - patient participated  Level of consciousness: awake  Pain scale: See nurse's notes for pain score.  Pain management: adequate  Airway patency: patent  Anesthetic complications: No anesthetic complications  PONV Status: none  Cardiovascular status: acceptable  Respiratory status: acceptable  Hydration status: acceptable    Comments: Patient seen and examined postoperatively; vital signs stable; SpO2 greater than or equal to 90%; cardiopulmonary status stable; nausea/vomiting adequately controlled; pain adequately controlled; no apparent anesthesia complications; patient discharged from anesthesia care when discharge criteria were met

## 2021-01-25 NOTE — ANESTHESIA PROCEDURE NOTES
Airway  Urgency: elective    Date/Time: 1/25/2021 9:09 AM  Airway not difficult    General Information and Staff    Patient location during procedure: OR    Indications and Patient Condition  Indications for airway management: airway protection    Preoxygenated: yes  Mask difficulty assessment: 1 - vent by mask    Final Airway Details  Final airway type: endotracheal airway      Successful airway: ETT  Cuffed: yes   Successful intubation technique: direct laryngoscopy  Facilitating devices/methods: intubating stylet  Endotracheal tube insertion site: oral  Blade: Anabella  Blade size: 3  ETT size (mm): 7.0  Cormack-Lehane Classification: grade I - full view of glottis  Placement verified by: chest auscultation and bronchoscopy   Measured from: gums  ETT/EBT to gums (cm): 20  Number of attempts at approach: 1  Assessment: lips, teeth, and gum same as pre-op and atraumatic intubation

## 2021-01-25 NOTE — ANESTHESIA PREPROCEDURE EVALUATION
Anesthesia Evaluation     Patient summary reviewed and Nursing notes reviewed   NPO Solid Status: > 8 hours  NPO Liquid Status: > 8 hours           Airway   Mallampati: II  TM distance: >3 FB  Neck ROM: full  No difficulty expected  Dental - normal exam     Pulmonary    Cardiovascular     (+) hypertension, hyperlipidemia,       Neuro/Psych  GI/Hepatic/Renal/Endo    (+) morbid obesity, GERD,      Musculoskeletal     Abdominal    Substance History      OB/GYN          Other   arthritis,                      Anesthesia Plan    ASA 3     general     intravenous induction     Anesthetic plan, all risks, benefits, and alternatives have been provided, discussed and informed consent has been obtained with: patient.    Plan discussed with CRNA.

## 2021-01-26 LAB
LAB AP CASE REPORT: NORMAL
PATH REPORT.FINAL DX SPEC: NORMAL
PATH REPORT.GROSS SPEC: NORMAL

## 2021-02-08 ENCOUNTER — OFFICE VISIT (OUTPATIENT)
Dept: SURGERY | Facility: CLINIC | Age: 66
End: 2021-02-08

## 2021-02-08 VITALS
DIASTOLIC BLOOD PRESSURE: 83 MMHG | HEIGHT: 60 IN | HEART RATE: 86 BPM | OXYGEN SATURATION: 98 % | TEMPERATURE: 97.7 F | SYSTOLIC BLOOD PRESSURE: 131 MMHG | WEIGHT: 220 LBS | BODY MASS INDEX: 43.19 KG/M2

## 2021-02-08 DIAGNOSIS — R20.2 NUMBNESS AND TINGLING OF RIGHT ARM: Primary | ICD-10-CM

## 2021-02-08 DIAGNOSIS — R20.0 NUMBNESS AND TINGLING OF RIGHT ARM: Primary | ICD-10-CM

## 2021-02-08 DIAGNOSIS — D17.1 LIPOMA OF BACK: Primary | ICD-10-CM

## 2021-02-08 PROCEDURE — 99024 POSTOP FOLLOW-UP VISIT: CPT | Performed by: SURGERY

## 2021-02-09 NOTE — PROGRESS NOTES
SUBJECTIVE:    Carito is seen in the office today follow-up from excision of a lipoma from the upper back.  Is now about 2 weeks out.  She is doing well.  Dressing is removed.    OBJECTIVE:    Incision is healing appropriately without infection.  There is no significant seroma.    ASSESSMENT:    Path report showed lipoma.  Still having some tingling into the right hand.    PLAN:    This point we would recommend that she go back to see orthopedics for the tingling in the hand.  Could be pinched nerve could be carpal tunnel etc.  Recheck in our office as needed

## 2021-03-29 ENCOUNTER — TRANSCRIBE ORDERS (OUTPATIENT)
Dept: ADMINISTRATIVE | Facility: HOSPITAL | Age: 66
End: 2021-03-29

## 2021-03-29 DIAGNOSIS — G56.01 CARPAL TUNNEL SYNDROME OF RIGHT WRIST: Primary | ICD-10-CM

## 2021-05-07 ENCOUNTER — APPOINTMENT (OUTPATIENT)
Dept: NEUROLOGY | Facility: HOSPITAL | Age: 66
End: 2021-05-07

## 2021-05-21 ENCOUNTER — APPOINTMENT (OUTPATIENT)
Dept: NEUROLOGY | Facility: HOSPITAL | Age: 66
End: 2021-05-21

## 2021-07-09 ENCOUNTER — HOSPITAL ENCOUNTER (OUTPATIENT)
Dept: NEUROLOGY | Facility: HOSPITAL | Age: 66
Discharge: HOME | End: 2021-07-09
Admitting: ORTHOPAEDIC SURGERY

## 2021-07-09 DIAGNOSIS — G56.01 CARPAL TUNNEL SYNDROME OF RIGHT WRIST: ICD-10-CM

## 2021-07-09 PROCEDURE — 95886 MUSC TEST DONE W/N TEST COMP: CPT | Performed by: PSYCHIATRY & NEUROLOGY

## 2021-07-09 PROCEDURE — 95908 NRV CNDJ TST 3-4 STUDIES: CPT

## 2021-07-09 PROCEDURE — 95886 MUSC TEST DONE W/N TEST COMP: CPT

## 2021-07-09 PROCEDURE — 95908 NRV CNDJ TST 3-4 STUDIES: CPT | Performed by: PSYCHIATRY & NEUROLOGY

## 2021-07-12 ENCOUNTER — APPOINTMENT (OUTPATIENT)
Dept: GENERAL RADIOLOGY | Facility: HOSPITAL | Age: 66
End: 2021-07-12

## 2021-07-12 ENCOUNTER — APPOINTMENT (OUTPATIENT)
Dept: CT IMAGING | Facility: HOSPITAL | Age: 66
End: 2021-07-12

## 2021-07-12 ENCOUNTER — HOSPITAL ENCOUNTER (EMERGENCY)
Facility: HOSPITAL | Age: 66
Discharge: HOME | End: 2021-07-12
Admitting: EMERGENCY MEDICINE

## 2021-07-12 VITALS
DIASTOLIC BLOOD PRESSURE: 76 MMHG | HEIGHT: 60 IN | BODY MASS INDEX: 43.19 KG/M2 | WEIGHT: 220 LBS | SYSTOLIC BLOOD PRESSURE: 135 MMHG | TEMPERATURE: 97.9 F | OXYGEN SATURATION: 98 % | HEART RATE: 82 BPM | RESPIRATION RATE: 16 BRPM

## 2021-07-12 DIAGNOSIS — S92.001A CLOSED NONDISPLACED FRACTURE OF RIGHT CALCANEUS, UNSPECIFIED PORTION OF CALCANEUS, INITIAL ENCOUNTER: ICD-10-CM

## 2021-07-12 DIAGNOSIS — S80.11XA CONTUSION OF MULTIPLE SITES OF RIGHT LOWER EXTREMITY, INITIAL ENCOUNTER: ICD-10-CM
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
## 2021-07-12 PROCEDURE — 73590 X-RAY EXAM OF LOWER LEG: CPT

## 2021-07-12 PROCEDURE — 73700 CT LOWER EXTREMITY W/O DYE: CPT

## 2021-07-12 PROCEDURE — 73610 X-RAY EXAM OF ANKLE: CPT

## 2021-07-12 PROCEDURE — 73630 X-RAY EXAM OF FOOT: CPT

## 2021-07-12 PROCEDURE — 73562 X-RAY EXAM OF KNEE 3: CPT

## 2021-07-12 PROCEDURE — 99283 EMERGENCY DEPT VISIT LOW MDM: CPT

## 2021-07-12 RX ORDER — HYDROCODONE BITARTRATE AND ACETAMINOPHEN 5; 325 MG/1; MG/1
1 TABLET ORAL ONCE AS NEEDED
Status: COMPLETED | OUTPATIENT
Start: 2021-07-12 | End: 2021-07-12

## 2021-07-12 RX ADMIN — HYDROCODONE BITARTRATE AND ACETAMINOPHEN 1 TABLET: 5; 325 TABLET ORAL at 22:18

## 2021-07-12 NOTE — ED NOTES
Pt states that she slipped down 3 stairs yesterday and c/o pain in her R leg from her knee down to her toes.      Rakel Rose, RN  07/12/21 1935

## 2021-07-12 NOTE — ED PROVIDER NOTES
Subjective   Patient presents with:  Fall    Isreal Jackson, FNP  No LMP recorded. Patient has had a hysterectomy.  No Known Allergies  Onset: Last night  Provoking: Worse with weightbearing  Quality: Sharp  Region & Radiation: Right knee, lower leg, foot ankle pain  Severity: Moderate  Time: Consistent  Context:  Patient is a 66-year-old female, presents emergency department after a fall last night.  Patient reports she fell on her last 3 steps.  She complains of pain in her right knee, lower leg, foot and ankle.  She denies any head injury or loss of conscious.  No back pain.  No severe headache or visual changes.  No numbness or tingling.          Review of Systems   Constitutional: Negative for chills and fever.   Respiratory: Negative for cough, chest tightness and shortness of breath.    Cardiovascular: Positive for leg swelling. Negative for chest pain and palpitations.   Gastrointestinal: Negative for abdominal pain, diarrhea, nausea and vomiting.   Musculoskeletal: Negative for back pain and neck pain.        Right knee, right lower leg, right foot ankle pain   Skin: Positive for color change (Bruising).   Neurological: Negative for headaches.       Past Medical History:   Diagnosis Date   • Anxiety    • Arthritis    • Bowel incontinence    • Depression    • Diarrhea    • GERD (gastroesophageal reflux disease)    • Hx of migraines    • Hyperlipidemia    • Leg cramps    • Memory deficit        No Known Allergies    Past Surgical History:   Procedure Laterality Date   • APPENDECTOMY     • CHOLECYSTECTOMY     • HYSTERECTOMY     • KNEE SURGERY Right     arthritis   • MASS EXCISION Right 1/25/2021    Procedure: MASS SOFT TISSUE EXCISION;  Surgeon: Kayden Goldsmith DO;  Location: [facility] MAIN OR;  Service: General;  Laterality: Right;   • NASAL SEPTUM SURGERY     • ROTATOR CUFF REPAIR Right        No family history on file.    Social History     Socioeconomic History   • Marital status:      Spouse name:  Not on file   • Number of children: Not on file   • Years of education: Not on file   • Highest education level: Not on file   Tobacco Use   • Smoking status: Never Smoker   • Smokeless tobacco: Never Used   Substance and Sexual Activity   • Alcohol use: Not Currently   • Drug use: Never   • Sexual activity: Defer           Objective   Physical Exam  Vitals and nursing note reviewed.   Constitutional:       General: She is not in acute distress.     Appearance: Normal appearance. She is not ill-appearing, toxic-appearing or diaphoretic.   HENT:      Head: Normocephalic and atraumatic.      Nose: Nose normal.      Mouth/Throat:      Mouth: Mucous membranes are moist.      Pharynx: Oropharynx is clear.   Eyes:      Extraocular Movements: Extraocular movements intact.      Conjunctiva/sclera: Conjunctivae normal.      Pupils: Pupils are equal, round, and reactive to light.   Cardiovascular:      Rate and Rhythm: Normal rate and regular rhythm.      Heart sounds: Normal heart sounds. No murmur heard.   No friction rub. No gallop.    Pulmonary:      Breath sounds: Normal breath sounds.   Abdominal:      General: Bowel sounds are normal.      Palpations: Abdomen is soft.   Musculoskeletal:      Cervical back: Normal range of motion and neck supple.      Right knee: No swelling, deformity, effusion, erythema, ecchymosis or bony tenderness. Normal range of motion. Tenderness present. Normal pulse.      Right lower leg: Tenderness present. No swelling or bony tenderness. No edema.      Right ankle: Swelling and ecchymosis present. No deformity. Tenderness present. Decreased range of motion. Normal pulse.      Right foot: Decreased range of motion. Normal capillary refill. Tenderness present. No swelling, bony tenderness or crepitus. Normal pulse.      Comments: Superficial abrasions of the lateral aspect of the right calf.  Bilateral DP pulses 2+.   Skin:     General: Skin is warm and dry.      Capillary Refill: Capillary  "refill takes less than 2 seconds.      Comments: Superficial abrasions noted to the lateral aspect of the right calf.  Ecchymosis noted to the foot and ankle diffusely.   Neurological:      General: No focal deficit present.      Mental Status: She is alert and oriented to person, place, and time.   Psychiatric:         Mood and Affect: Mood normal.         Behavior: Behavior normal.         Procedures           ED Course      /76   Pulse 82   Temp 97.9 °F (36.6 °C)   Resp 16   Ht 152.4 cm (60\")   Wt 99.8 kg (220 lb)   SpO2 98%   Breastfeeding No   BMI 42.97 kg/m²   Labs Reviewed - No data to display  Medications   HYDROcodone-acetaminophen (NORCO) 5-325 MG per tablet 1 tablet (1 tablet Oral Given 7/12/21 2218)     XR Knee 3 View Right    Result Date: 7/12/2021  No acute osseous abnormality is identified of the right knee.  Electronically Signed By-Viry Darling MD On:7/12/2021 8:24 PM This report was finalized on [id number] by  Viry Darling MD.    XR Tibia Fibula 2 View Right    Result Date: 7/12/2021  No acute osseous abnormality is identified of the right tibia or fibula.  Electronically Signed By-Viry Darling MD On:7/12/2021 8:23 PM This report was finalized on [id number] by  Viry Darling MD.    XR Ankle 3+ View Right    Result Date: 7/12/2021  Suspected avulsion fracture from the lateral calcaneus.  Electronically Signed By-Viry Darling MD On:7/12/2021 8:24 PM This report was finalized on [id number] by  Viry Darling MD.    XR Foot 3+ View Right    Result Date: 7/12/2021  Suspected avulsion fracture from the lateral calcaneus.  Electronically Signed By-Viry Darling MD On:7/12/2021 8:22 PM This report was finalized on [id number] by  Viry Darling MD.           Patient INSPECT report queried and reviewed.  Patient received tramadol from her primary care provider regularly.  There is a fill date of 7/6/2021 and for tramadol quantity 90.  Patient requested pain medication " here in the ED, she will be given a dose of Norco here in the ED, but advised to continue her pain medication with this provider.                                MDM  Number of Diagnoses or Management Options  Closed nondisplaced fracture of right calcaneus, unspecified portion of calcaneus, initial encounter  Contusion of multiple sites of right lower extremity, initial encounter  Fall, initial encounter  Diagnosis management comments: Appropriate PPE was worn during the duration of the care for this patient while in the emergency department per [facility] Policy    ----Differentials: Fracture, contusion, sprain  This list is not all inclusive and does not constitute the entireity of considered causes.     ----Lab and imaging reviewd by me, imaging interpreted per radiologist and independly viewed by me: As above.    ED  Course----Patient was brought back to the emergency department room for evaluation and placed on appropriate monitoring.      Vital signs have  been reviewed. Patient is afebrile. Non toxic in appearance. Alert and oriented to person, place, time and situation.  I spoke with orthopedics regarding the patient's fracture, he requested a CT of the lower extremity, he will follow the test result in the office, does not require the patient to wait for results or call back in the ED.  Advised patient be placed in a walking boot and call the office for follow-up appointment.    I spoke with the patient at the bedside regarding their plan of care, discharge instruction, home care, prescriptions, and importance follow-up.  We discussed test results at the bedside, including incidental abnormal labs, radiological findings, understands need for follow-up with primary care or specialist if indicated.      Patient was made aware of indications to return to the emergency department.  Patient agrees with the current plan of care for discharge, verbalized understanding of all instructions    Pt is aware that  discharge does not mean that nothing is wrong but it indicates no emergency is present and they must continue care with follow-up as given below or physician of their choice                               Amount and/or Complexity of Data Reviewed  Tests in the radiology section of CPT®: reviewed  Discuss the patient with other providers: yes (Discussed with Dr. Dobson, he reviewed the patient's x-ray, and requested CT of the lower extremity, which he will follow in the office. Does not require patient to wait for result in ED or call back.   Requested patient be placed in a walking boot and will call office for follow-up appointment.)  Independent visualization of images, tracings, or specimens: yes    Patient Progress  Patient progress: stable      Final diagnoses:   Fall, initial encounter   Closed nondisplaced fracture of right calcaneus, unspecified portion of calcaneus, initial encounter   Contusion of multiple sites of right lower extremity, initial encounter       ED Disposition  ED Disposition     ED Disposition Condition Comment    Discharge Stable           Isreal Jackson, FNP  [address] [facility] [address] [phone]    Schedule an appointment as soon as possible for a visit       [facility] EMERGENCY DEPARTMENT  [facility] [facility] [address]  [phone]    As needed, If symptoms worsen    Kayden Dobson Jr., MD  [address] [address]  [phone]    Schedule an appointment as soon as possible for a visit   Please call to schedule appointment for follow up         Medication List      No changes were made to your prescriptions during this visit.          Carlee Henry, APRN  07/13/21 030

## 2021-07-13 NOTE — DISCHARGE INSTRUCTIONS
Please follow-up with your primary care provider, call tomorrow for an appointment, if you do not have a primary care provider, please use the patient connection above to us to help establish care, please call as soon as possible.    Return the emergency department for new or worsening symptoms    Take medications as prescribed, any changes will be noted on your discharge instruction    Ice to area 3-4 times per day, 15-20 minutes at a time, do not use while sleeping or for extended periods of time.     Please follow-up with Dr. Valdes, call for an appointment tomorrow    Keep splint in place until follow-up    Continue home tramadol for pain

## 2021-12-01 NOTE — TELEPHONE ENCOUNTER
Dr. Ambrocio, please advise of below portal message from patient's daughter.   Patient called with negative COVID test results.

## 2022-11-25 ENCOUNTER — HOSPITAL ENCOUNTER (EMERGENCY)
Facility: HOSPITAL | Age: 67
Discharge: LEFT BEFORE DISCHARGE | End: 2022-11-25
Attending: EMERGENCY MEDICINE

## 2022-11-25 PROCEDURE — 99211 OFF/OP EST MAY X REQ PHY/QHP: CPT | Performed by: EMERGENCY MEDICINE

## 2023-08-08 ENCOUNTER — OFFICE VISIT (OUTPATIENT)
Dept: ORTHOPEDIC SURGERY | Facility: CLINIC | Age: 68
End: 2023-08-08
Payer: MEDICARE

## 2023-08-08 VITALS — BODY MASS INDEX: 40.05 KG/M2 | HEART RATE: 85 BPM | HEIGHT: 60 IN | WEIGHT: 204 LBS | OXYGEN SATURATION: 97 %

## 2023-08-08 DIAGNOSIS — S42.231A CLOSED 3-PART FRACTURE OF SURGICAL NECK OF RIGHT HUMERUS, INITIAL ENCOUNTER: Primary | ICD-10-CM

## 2023-08-08 NOTE — PROGRESS NOTES
Primary Care Sports Medicine Office Visit Note     Patient ID: Carito Anderson is a 68 y.o. female.    Chief Complaint:  Chief Complaint   Patient presents with    Right Upper Arm - Follow-up, Pain     HPI:    Ms. Carito Anderson is a 68 y.o. female. The patient presents to the clinic today for 4-week follow-up of right humeral head fracture. Original date of injury was 07/08/2023.    The patient is feeling good. She is wearing the humeral cuff, and she feels like it keeps her safe and protected. She denies any new injuries or falls. Her pain is minimal. She is a non-smoker.     Past Medical History:   Diagnosis Date    Anxiety     Arthritis     Bowel incontinence     Depression     Diarrhea     GERD (gastroesophageal reflux disease)     Hx of migraines     Hyperlipidemia     Leg cramps     Memory deficit        Past Surgical History:   Procedure Laterality Date    APPENDECTOMY      CHOLECYSTECTOMY      HYSTERECTOMY      KNEE SURGERY Right     arthritis    MASS EXCISION Right 1/25/2021    Procedure: MASS SOFT TISSUE EXCISION;  Surgeon: Kayden Goldsmith DO;  Location: [facility] MAIN OR;  Service: General;  Laterality: Right;    NASAL SEPTUM SURGERY      ROTATOR CUFF REPAIR Right        Family History   Problem Relation Age of Onset    No Known Problems Mother     No Known Problems Father     No Known Problems Sister     No Known Problems Brother     No Known Problems Maternal Aunt     No Known Problems Maternal Uncle     No Known Problems Paternal Aunt     No Known Problems Paternal Uncle     No Known Problems Maternal Grandmother     No Known Problems Maternal Grandfather     No Known Problems Paternal Grandmother     No Known Problems Paternal Grandfather     No Known Problems Other     Anesthesia problems Neg Hx     Broken bones Neg Hx     Cancer Neg Hx     Clotting disorder Neg Hx     Collagen disease Neg Hx     Diabetes Neg Hx     Dislocations Neg Hx     Osteoporosis Neg Hx     Rheumatologic disease Neg Hx      "Scoliosis Neg Hx     Severe sprains Neg Hx      Social History     Occupational History    Not on file   Tobacco Use    Smoking status: Never     Passive exposure: Never    Smokeless tobacco: Never   Vaping Use    Vaping Use: Never used   Substance and Sexual Activity    Alcohol use: Not Currently    Drug use: Never    Sexual activity: Defer      Review of Systems   Constitutional:  Negative for activity change and fever.   Musculoskeletal:  Positive for arthralgias.   Skin:  Negative for color change and rash.   Neurological:  Negative for weakness.   Objective:    Pulse 85   Ht 152.4 cm (60\")   Wt 92.5 kg (204 lb)   SpO2 97%   BMI 39.84 kg/mý     Physical Examination:  Physical Exam  Vitals and nursing note reviewed.   Constitutional:       General: She is not in acute distress.     Appearance: She is well-developed. She is not diaphoretic.   HENT:      Head: Normocephalic and atraumatic.   Eyes:      Conjunctiva/sclera: Conjunctivae normal.   Pulmonary:      Effort: Pulmonary effort is normal. No respiratory distress.   Skin:     General: Skin is warm.      Capillary Refill: Capillary refill takes less than 2 seconds.   Neurological:      Mental Status: She is alert.     Right Shoulder Exam     Range of Motion   Right shoulder active abduction: 45 degrees.   Forward flexion:  60     Comments:  Right shoulder exam reveals moderate pain with range of motion.      Imaging and other tests:  Repeat three view x-ray of the right shoulder today reveals no change in alignment of previously noted 3-part humeral head and neck fracture. There is mild hazy callus formation seen at the lateral most aspect of the fracture line, consistent with early/mild healing.    Assessment and Plan:    1. Closed 3-part fracture of surgical neck of right humerus, subsequent encounter, with routine healing  - XR shoulder 2+ vw right    The patient is doing well today and pain, and swelling have both continue to improve. There is moderate " decrease in range of motion, but we discussed that we need to allow fracture healing first before we start physical therapy. I would like for her to continue sling immobilization with humeral cuff brace for the next 4 weeks. Return to clinic at that time for repeat imaging and hopeful discontinuation of immobilization/start physical therapy then.    Transcribed from ambient dictation for Christopher Braswell II,  by Sunshine Wong.  08/08/23   17:38 EDT    Patient or patient representative verbalized consent to the visit recording.  I have personally performed the services described in this document as transcribed by the above individual, and it is both accurate and complete.    Disclaimer: Please note that areas of this note were completed with computer voice recognition software.  Quite often unanticipated grammatical, syntax, homophones, and other interpretive errors are inadvertently transcribed by the computer software. Please excuse any errors that have escaped final proofreading.

## 2023-09-05 ENCOUNTER — OFFICE VISIT (OUTPATIENT)
Dept: ORTHOPEDIC SURGERY | Facility: CLINIC | Age: 68
End: 2023-09-05
Payer: MEDICARE

## 2023-09-05 VITALS
BODY MASS INDEX: 40.05 KG/M2 | OXYGEN SATURATION: 97 % | HEIGHT: 60 IN | HEART RATE: 74 BPM | WEIGHT: 204 LBS | RESPIRATION RATE: 18 BRPM

## 2023-09-05 DIAGNOSIS — S42.231A CLOSED 3-PART FRACTURE OF SURGICAL NECK OF RIGHT HUMERUS, INITIAL ENCOUNTER: Primary | ICD-10-CM

## 2023-09-05 NOTE — PROGRESS NOTES
Primary Care Sports Medicine Office Visit Note     Patient ID: Carito Anderson is a 68 y.o. female.    Chief Complaint:  Chief Complaint   Patient presents with    Right Upper Arm - Follow-up     HPI:    Ms. Carito Anderson is a 68 y.o. female. The patient presents to the clinic today for follow-up evaluation of the right humerus.    The patient reports that her shoulder is still painful; however, it is not real bad. She has been wearing her sling at night and during the day. Her injury was on 07/01/2023.    Past Medical History:   Diagnosis Date    Anxiety     Arthritis     Bowel incontinence     Depression     Diarrhea     GERD (gastroesophageal reflux disease)     Hx of migraines     Hyperlipidemia     Leg cramps     Memory deficit        Past Surgical History:   Procedure Laterality Date    APPENDECTOMY      CHOLECYSTECTOMY      HYSTERECTOMY      KNEE SURGERY Right     arthritis    MASS EXCISION Right 1/25/2021    Procedure: MASS SOFT TISSUE EXCISION;  Surgeon: Kayden Goldsmith DO;  Location: [facility] OR;  Service: General;  Laterality: Right;    NASAL SEPTUM SURGERY      ROTATOR CUFF REPAIR Right        Family History   Problem Relation Age of Onset    No Known Problems Mother     No Known Problems Father     No Known Problems Sister     No Known Problems Brother     No Known Problems Maternal Aunt     No Known Problems Maternal Uncle     No Known Problems Paternal Aunt     No Known Problems Paternal Uncle     No Known Problems Maternal Grandmother     No Known Problems Maternal Grandfather     No Known Problems Paternal Grandmother     No Known Problems Paternal Grandfather     No Known Problems Other     Anesthesia problems Neg Hx     Broken bones Neg Hx     Cancer Neg Hx     Clotting disorder Neg Hx     Collagen disease Neg Hx     Diabetes Neg Hx     Dislocations Neg Hx     Osteoporosis Neg Hx     Rheumatologic disease Neg Hx     Scoliosis Neg Hx     Severe sprains Neg Hx      Social History  "    Occupational History    Not on file   Tobacco Use    Smoking status: Never     Passive exposure: Never    Smokeless tobacco: Never   Vaping Use    Vaping Use: Never used   Substance and Sexual Activity    Alcohol use: Not Currently    Drug use: Never    Sexual activity: Defer      Review of Systems   Constitutional:  Negative for activity change and fever.   Musculoskeletal:  Positive for arthralgias.   Skin:  Negative for color change and rash.   Neurological:  Negative for weakness.   Objective:    Pulse 74   Resp 18   Ht 152.4 cm (60\")   Wt 92.5 kg (204 lb)   SpO2 97%   BMI 39.84 kg/m²     Physical Examination:  Physical Exam  Vitals and nursing note reviewed.   Constitutional:       General: She is not in acute distress.     Appearance: She is well-developed. She is not diaphoretic.   HENT:      Head: Normocephalic and atraumatic.   Eyes:      Conjunctiva/sclera: Conjunctivae normal.   Pulmonary:      Effort: Pulmonary effort is normal. No respiratory distress.   Skin:     General: Skin is warm.      Capillary Refill: Capillary refill takes less than 2 seconds.   Neurological:      Mental Status: She is alert.     Right Shoulder Exam     Range of Motion   Active abduction:  abnormal Right shoulder active abduction: moderately limited range of motion, active and passively to about 90 degrees of forward flexion, 80 degrees of lateral abduction.  Passive abduction:  abnormal   Extension:  abnormal   External rotation:  abnormal   Forward flexion:  abnormal   Internal rotation 0 degrees:  abnormal   Internal rotation 90 degrees:  abnormal     Muscle Strength   Right shoulder normal muscle strength: Muscle testing is difficult due to pain and loss of range of motion..      Imaging and other tests:  Three view x-ray of the right shoulder reveals no displacement or change in previously noted three prior fracture of the humeral head and neck. There is very mild callus formation consistent with early signs of " healing.    Assessment and Plan:    1. Closed 3-part fracture of surgical neck of right humerus, initial encounter  - XR Shoulder 2+ View Right    The patient shows mild signs of healing on x-ray today. We will advance to range of motion only at physical therapy. Return to clinic in 4 weeks for repeat imaging and hopefully continuation to stretching and strengthening of the shoulder at that time in physical therapy.    Transcribed from ambient dictation for Christopher Braswell II, DO by Alejandra Tavares.  09/05/23   16:20 EDT    Patient or patient representative verbalized consent to the visit recording.  I have personally performed the services described in this document as transcribed by the above individual, and it is both accurate and complete.    Disclaimer: Please note that areas of this note were completed with computer voice recognition software.  Quite often unanticipated grammatical, syntax, homophones, and other interpretive errors are inadvertently transcribed by the computer software. Please excuse any errors that have escaped final proofreading.

## 2023-09-20 ENCOUNTER — TREATMENT (OUTPATIENT)
Dept: PHYSICAL THERAPY | Facility: CLINIC | Age: 68
End: 2023-09-20
Payer: MEDICARE

## 2023-09-20 DIAGNOSIS — S42.231A CLOSED 3-PART FRACTURE OF SURGICAL NECK OF RIGHT HUMERUS, INITIAL ENCOUNTER: Primary | ICD-10-CM

## 2023-09-20 NOTE — PROGRESS NOTES
Physical Therapy Initial Evaluation and Plan of Care    [address] [facility] [place] [address]  [phone] (p)  [phone] (f)    Patient: Carito Anderson   : 1955  Diagnosis/ICD-10 Code:  Closed 3-part fracture of surgical neck of right humerus, initial encounter [S42.231A]  Referring practitioner: Christopher Braswell I*  Date of Initial Visit: 2023  Today's Date: 2023  Patient seen for 1 sessions         Subjective Questionnaire: QuickDASH: 61% dysfunction, work subscale 50%       Subjective   Pt fell and broke prox humerus 23 she was in sling and brace for a few months, no longer needs. Showing slow healing. At this point pain only when moving and usually achey occasionally sharp. Better with rest and heat and worse with activity. She is allowed to do some ROM/activity with R UE within tolerance, no heavy lifting per MD. She had previous rotator cuff repair on R shoulder. She is L hand dominant. Referred to PT for ROM only. She is able to reach a little bit, drive, type on computer. Can't sleep on R side, reach overhead, sweep, don/dof bra, has to modify dressing and housework currently. Son and daughter help her as needed for hair etc. She is currently sleeping on couch, has adjustable bed. She works as , primarily desk work. Able to do all tasks at work.      Pain 3/10 (3/10 to 7/10)    MD follow up 10/3/23    Med hx, see epic: anxiety, arthritis, bowel incontinence, depression, GERD, hx migraines, memory deficit, leg cramps, hyperlipidemia, lipoma back, HTN, hx R rotator cuff repair, BMI 40    Objective          Postural Observations  Seated posture: fair  Standing posture: fair    Additional Postural Observation Details  Forward shoulder bilat, throacic kyphosis      Observations     Additional Shoulder Observation Details  Pain: R lateral arm, posterior shoulder, anterior shoulder, scap    Palpation     Right Tenderness of the biceps and middle deltoid.      Cervical/Thoracic Screen   Cervical range of motion within normal limits with the following exceptions: Min restriction, neck is sore from sleeping on couch      Neurological Testing     Additional Neurological Details  Denies N/T    Active Range of Motion   Left Shoulder   Flexion: 158 degrees   Extension: 45 (scap protraction) degrees   Abduction: 160 degrees   External rotation BTH: T3   Internal rotation BTB: T12 (scap protraction)     Right Shoulder   Flexion: 80 degrees with pain  Extension: 30 degrees   Abduction: 60 degrees with pain  External rotation BTH: Active external rotation behind the head: ear. with pain  Internal rotation BTB: Active internal rotation behind the back: PSIS. with pain    Additional Active Range of Motion Details  Elbow ext WFL, flex WFL with mild restriction end range, supination 0, pronation WFL. Wrist and elbow WFL.  L 70 supination, pronation WFL, elbow flex/ext WFL.    Passive Range of Motion     Right Shoulder   Flexion: 80 (scap hike) degrees   Abduction: 65 degrees   External rotation 45°: 30 degrees   Internal rotation 45°: 45 degrees     Additional Passive Range of Motion Details  Scaption 60     Scapular Mobility     Right Shoulder   Scapular mobility: fair    Joint Play     Additional Joint Play Details  Not tested    Strength/Myotome Testing     Left Shoulder   Normal muscle strength    Additional Strength Details  Not tested R GH or scap.   Elbow flex/ext 4-/5, hand and wrist 4+/5    not tested        Assessment & Plan       Assessment  Impairments: abnormal or restricted ROM, activity intolerance, impaired physical strength, lacks appropriate home exercise program and pain with function   Functional limitations: carrying objects, lifting, sleeping, pulling, pushing, uncomfortable because of pain, moving in bed, reaching behind back, reaching overhead and unable to perform repetitive tasks   Assessment details: Patient presents with R shoulder impairments  following R prox humerus fx with immobilization 7/8/2023. Functional limitations including reaching, dressing, sleeping, sweeping, housework, fixing hair - will initiate therapeutic interventions to reduce pain and restore function: has the potential to benefit from therapy, to return to PLOF including independent ADLs and housework and appears motivated to participate in therapy at this time.      Prognosis: good    Goals  Plan Goals: SHORT TERM GOALS:   1. Patient to be compliant with their initial HEP in 2 weeks  2. Patient Independent with AAROM shoulder ex. Pulley or cane in 2 weeks  3. Patient has close to full PROM R Shoulder in 5 weeks  4. Patient to exhibit active shoulder flexion/abduction R AROM 100 or better to assist with overhead activities without pain in 5 weeks  5. IR R AROM to L1 R shoulder for LE dressing in 5 weeks  6. ER R AROM behind head for fixing and washing hair independently in 5 weeks    LONG TERM GOALS:   1. Pt score < 15 % perceived disability on DASH by D/C  2. Pt has functional R AROM shoulder flexion/abduction 130 without pain for reaching to cabinet  3. Pt has 4/5 UE R strength or better for chores around the house and ADLs by D/C  4. Pt to sleep with 75% less interference from R shoulder pain by D/C  5. Pt reports readiness for DC to Independent HEP for self management of condition.      Plan  Therapy options: will be seen for skilled therapy services  Planned modality interventions: cryotherapy, electrical stimulation/[place] stimulation, ultrasound, thermotherapy (hydrocollator packs) and dry needling  Planned therapy interventions: manual therapy, joint mobilization, home exercise program, functional ROM exercises, flexibility, neuromuscular re-education, soft tissue mobilization, strengthening, stretching, therapeutic activities, body mechanics training, ADL retraining and postural training  Frequency: 2x week  Duration in visits: 20  Duration in weeks: 12  Treatment plan  discussed with: patient        History # of Personal Factors and/or Comorbidities: HIGH (3+)  Examination of Body System(s): # of elements: LOW (1-2)  Clinical Presentation: STABLE   Clinical Decision Making: LOW     Timed:         Manual Therapy:    15     mins  10899;     Therapeutic Exercise:     10    mins  53810;     Neuromuscular Edwardo:        mins  99736;    Therapeutic Activity:          mins  12572;     Gait Training:           mins  34400;     Ultrasound:          mins  15406;    Ionto                                   mins   12294  Self Care                            mins   94296  Aquatic                               mins 51413      Un-Timed:  Electrical Stimulation:    15     mins  56500 ( );  Dry Needling          mins self-pay  Traction          mins 38404  Low Eval     20     Mins  29656  Mod Eval          Mins  63328  High Eval                            Mins  99051  Re-Eval                               mins  73998        Timed Treatment:   25   mins   Total Treatment:     60   mins    PT SIGNATURE: Chrystal Koroma PT, DPT   IN LICENCE: [id number]  DATE TREATMENT INITIATED: 9/20/2023    Medicare Initial Certification  Certification Period: 12/18/2023  I certify that the therapy services are furnished while this patient is under my care.  The services outlined above are required by this patient, and will be reviewed every 90 days.     PHYSICIAN: Christopher Braswell II, DO      DATE:     Please sign and return via fax to [phone].. Thank you, [facility] Physical Therapy.

## 2023-09-26 ENCOUNTER — TREATMENT (OUTPATIENT)
Dept: PHYSICAL THERAPY | Facility: CLINIC | Age: 68
End: 2023-09-26
Payer: MEDICARE

## 2023-09-26 DIAGNOSIS — S42.231A CLOSED 3-PART FRACTURE OF SURGICAL NECK OF RIGHT HUMERUS, INITIAL ENCOUNTER: Primary | ICD-10-CM

## 2023-09-26 NOTE — PROGRESS NOTES
Physical Therapy Treatment Note    [address] [facility] [place] [address]  [phone] (p)  [phone] (f)    VISIT#: 2     Diagnosis Plan   1. Closed 3-part fracture of surgical neck of right humerus, initial encounter          Subjective   Carito Anderson reports: 4/10 achey dull lateral shoulder.       Objective     See Exercise, Manual, and Modality Logs for complete treatment.     Patient Education: reviewed HEP, made some corrections on form.     Assessment/Plan Good tolerance to PROM, able to move further in range, no increased pain. Better tolerance to AAROM at table with thumb up position.       Progress per Plan of Care Monitor and progress as tolerated.            Timed:         Manual Therapy:    16     mins  27060;     Therapeutic Exercise:    14     mins  03535;     Neuromuscular Edwardo:        mins  65171;    Therapeutic Activity:          mins  26334;     Gait Training:           mins  55677;     Ultrasound:          mins  57130;    Ionto                                   mins   10896  Self Care                            mins   18245  Canalith Repos                   mins  4209  Aquatic                               mins 96644    Un-Timed:  Electrical Stimulation:     15    mins  22408 ( );  Dry Needling          mins self-pay  Traction          mins 49989  Low Eval          Mins  03662  Mod Eval          Mins  44387  High Eval                            Mins  52974  Re-Eval                              mins  62428    Timed Treatment:   30   mins   Total Treatment:     45   mins    Chrystal Koroma PT, DPT  IN LICENCE: [id number]

## 2023-10-03 ENCOUNTER — TREATMENT (OUTPATIENT)
Dept: PHYSICAL THERAPY | Facility: CLINIC | Age: 68
End: 2023-10-03
Payer: MEDICARE

## 2023-10-03 DIAGNOSIS — S42.231A CLOSED 3-PART FRACTURE OF SURGICAL NECK OF RIGHT HUMERUS, INITIAL ENCOUNTER: Primary | ICD-10-CM

## 2023-10-03 NOTE — PROGRESS NOTES
Physical Therapy Treatment Note    [address] [facility] [place] [address]  [phone] (p)  [phone] (f)    VISIT#: 3     Diagnosis Plan   1. Closed 3-part fracture of surgical neck of right humerus, initial encounter          Subjective   Carito Anderson reports: 2/10 HEP is better with thumb up position. Able to use arm a little more without thinking about it  (ex. carrying cup of ice). Pt reports does not tolerate ice well, requests MHP.    Objective     See Exercise, Manual, and Modality Logs for complete treatment.     Assessment/Plan Mild progression of PROM and gentle AAROM exercise, good tolerance without increased pain.    Progress per Plan of Care            Timed:         Manual Therapy:    16     mins  05955;     Therapeutic Exercise:    11     mins  21748;     Neuromuscular Edwardo:        mins  88158;    Therapeutic Activity:          mins  10572;     Gait Training:          mins  13781;     Ultrasound:          mins  96287;    Ionto                                   mins   63411  Self Care                            mins   57602  Canalith Repos                   mins  4209  Aquatic                               mins 37909    Un-Timed:  Electrical Stimulation:    15     mins  05400 ( );  Dry Needling          mins self-pay  Traction          mins 32767  Low Eval          Mins  22180  Mod Eval          Mins  11638  High Eval                            Mins  67654  Re-Eval                               mins  02652    Timed Treatment:   27   mins   Total Treatment:     42   mins    Chrystal Koroma PT, DPT  IN LICENCE: [id number]

## 2023-10-06 ENCOUNTER — OFFICE VISIT (OUTPATIENT)
Dept: ORTHOPEDIC SURGERY | Facility: CLINIC | Age: 68
End: 2023-10-06
Payer: MEDICARE

## 2023-10-06 VITALS — HEART RATE: 83 BPM | HEIGHT: 60 IN | OXYGEN SATURATION: 96 % | WEIGHT: 200 LBS | BODY MASS INDEX: 39.27 KG/M2

## 2023-10-06 DIAGNOSIS — S42.231A CLOSED 3-PART FRACTURE OF SURGICAL NECK OF RIGHT HUMERUS, INITIAL ENCOUNTER: Primary | ICD-10-CM

## 2023-10-06 RX ORDER — MONTELUKAST SODIUM 10 MG/1
TABLET ORAL
COMMUNITY
Start: 2023-09-19

## 2023-10-06 NOTE — PROGRESS NOTES
Primary Care Sports Medicine Office Visit Note     Patient ID: Carito Anderson is a 68 y.o. female.    Chief Complaint:  Chief Complaint   Patient presents with    Right Shoulder - Follow-up, Pain     HPI:    Ms. Carito Anderson is a 68 y.o. female. The patient presents to the clinic today for right shoulder pain.    The patient's right shoulder is sore because she exercised more than she should. She reports that her motion is not bad. The injury occurred on 07/08/2023. The patient a new x-ray on 10/06/2023. The patient cannot make a fist. She can comb her hair, but she cannot make a pony tail.      Past Medical History:   Diagnosis Date    Anxiety     Arthritis     Bowel incontinence     Depression     Diarrhea     GERD (gastroesophageal reflux disease)     Hx of migraines     Hyperlipidemia     Leg cramps     Memory deficit        Past Surgical History:   Procedure Laterality Date    APPENDECTOMY      CHOLECYSTECTOMY      HYSTERECTOMY      KNEE SURGERY Right     arthritis    MASS EXCISION Right 1/25/2021    Procedure: MASS SOFT TISSUE EXCISION;  Surgeon: Kayden Goldsmith DO;  Location: [facility] OR;  Service: General;  Laterality: Right;    NASAL SEPTUM SURGERY      ROTATOR CUFF REPAIR Right        Family History   Problem Relation Age of Onset    No Known Problems Mother     No Known Problems Father     No Known Problems Sister     No Known Problems Brother     No Known Problems Maternal Aunt     No Known Problems Maternal Uncle     No Known Problems Paternal Aunt     No Known Problems Paternal Uncle     No Known Problems Maternal Grandmother     No Known Problems Maternal Grandfather     No Known Problems Paternal Grandmother     No Known Problems Paternal Grandfather     No Known Problems Other     Anesthesia problems Neg Hx     Broken bones Neg Hx     Cancer Neg Hx     Clotting disorder Neg Hx     Collagen disease Neg Hx     Diabetes Neg Hx     Dislocations Neg Hx     Osteoporosis Neg Hx     Rheumatologic  "disease Neg Hx     Scoliosis Neg Hx     Severe sprains Neg Hx      Social History     Occupational History    Not on file   Tobacco Use    Smoking status: Never     Passive exposure: Never    Smokeless tobacco: Never   Vaping Use    Vaping Use: Never used   Substance and Sexual Activity    Alcohol use: Not Currently    Drug use: Never    Sexual activity: Defer      Review of Systems   Constitutional:  Negative for activity change and fever.   Musculoskeletal:  Positive for arthralgias.   Skin:  Negative for color change and rash.   Neurological:  Negative for weakness.   Objective:    Pulse 83   Ht 152.4 cm (60\")   Wt 90.7 kg (200 lb)   SpO2 96%   BMI 39.06 kg/mý     Physical Examination:  Physical Exam  Vitals and nursing note reviewed.   Constitutional:       General: She is not in acute distress.     Appearance: She is well-developed. She is not diaphoretic.   HENT:      Head: Normocephalic and atraumatic.   Eyes:      Conjunctiva/sclera: Conjunctivae normal.   Pulmonary:      Effort: Pulmonary effort is normal. No respiratory distress.   Skin:     General: Skin is warm.      Capillary Refill: Capillary refill takes less than 2 seconds.   Neurological:      Mental Status: She is alert.     Right Shoulder Exam     Comments:  Right shoulder examination yields moderately decreased range of motion to about 45 degrees of lateral abduction with excessive scapular motion over this, near 90 degrees of forward flexion, internal and external rotation are preserved. Special tests are difficult due to loss of active range of motion.      Imaging and other tests:    Repeat two view x-ray of the proximal humerus reveals again seen mild impaction and sclerotic change without obvious sign of healing of previously noted transverse humeral head and neck fracture.    Assessment and Plan:    1. Closed 3-part fracture of surgical neck of right humerus, subsequent encounter, with delayed healing  - XR Shoulder 2+ View " Right    Patient is doing moderately well today, okay to discontinue shoulder immobilizing sling.  Continue calcium and vitamin D supplementation.  Okay to start physical therapy and activity/use of right upper extremity as tolerated.  RTC in 2 to 3 months for follow-up evaluation.    Transcribed from ambient dictation for Christopher Braswell II,  by Rakel Carey.  10/06/23   15:36 EDT    Patient or patient representative verbalized consent to the visit recording.  I have personally performed the services described in this document as transcribed by the above individual, and it is both accurate and complete.    Disclaimer: Please note that areas of this note were completed with computer voice recognition software.  Quite often unanticipated grammatical, syntax, homophones, and other interpretive errors are inadvertently transcribed by the computer software. Please excuse any errors that have escaped final proofreading.

## 2023-10-11 ENCOUNTER — TREATMENT (OUTPATIENT)
Dept: PHYSICAL THERAPY | Facility: CLINIC | Age: 68
End: 2023-10-11
Payer: MEDICARE

## 2023-10-11 DIAGNOSIS — S42.231A CLOSED 3-PART FRACTURE OF SURGICAL NECK OF RIGHT HUMERUS, INITIAL ENCOUNTER: Primary | ICD-10-CM

## 2023-10-11 NOTE — PROGRESS NOTES
Physical Therapy Treatment Note    [address] [facility] [place] [address]  [phone] (p)  [phone] (f)    VISIT#: 4     Diagnosis Plan   1. Closed 3-part fracture of surgical neck of right humerus, initial encounter          Subjective   Caritojane Anderson reports: pain 2-3/10. Pt notes MD follow up was good, slow healing. No lifting continue PT ROM. Return to full ROM not expected. She is doing fine with HEP. Takes calcium supplement.     Objective     See Exercise, Manual, and Modality Logs for complete treatment.     Patient Education: continue posture correction. Gentle AAROM HEP.    Assessment/Plan Good tolerance to mild exercise progression without increased pain including gentle rhythmic stabilization and partial WB through R UE.       Progress per Plan of Care Continue to progress as tolerated.            Timed:         Manual Therapy:    16     mins  02390;     Therapeutic Exercise:    10     mins  65029;     Neuromuscular Edwardo:        mins  46943;    Therapeutic Activity:          mins  90952;     Gait Training:           mins  68261;     Ultrasound:          mins  27043;    Ionto                                   mins   95810  Self Care                            mins   51489  Canalith Repos                   mins  4209  Aquatic                               mins 11351    Un-Timed:  Electrical Stimulation:     15    mins  06493 ( );  Dry Needling          mins self-pay  Traction          mins 90579  Low Eval          Mins  49138  Mod Eval          Mins  70724  High Eval                            Mins  60149  Re-Eval                               mins  13020    Timed Treatment:   25   mins   Total Treatment:     40   mins    Chrystal Koroma PT, DPT  IN LICENCE: [id number]

## 2023-10-24 ENCOUNTER — TREATMENT (OUTPATIENT)
Dept: PHYSICAL THERAPY | Facility: CLINIC | Age: 68
End: 2023-10-24
Payer: MEDICARE

## 2023-10-24 DIAGNOSIS — S42.231A CLOSED 3-PART FRACTURE OF SURGICAL NECK OF RIGHT HUMERUS, INITIAL ENCOUNTER: Primary | ICD-10-CM

## 2023-10-24 PROCEDURE — G0283 ELEC STIM OTHER THAN WOUND: HCPCS | Performed by: PHYSICAL THERAPIST

## 2023-10-24 PROCEDURE — 97110 THERAPEUTIC EXERCISES: CPT | Performed by: PHYSICAL THERAPIST

## 2023-10-24 PROCEDURE — 97140 MANUAL THERAPY 1/> REGIONS: CPT | Performed by: PHYSICAL THERAPIST

## 2023-10-24 NOTE — LETTER
Carito Anderson  [birth year]    Physical Therapy Progress Note    [address]   [place] [address]  [phone] (p)  [phone] (f)    VISIT#: 5     Diagnosis Plan   1. Closed 3-part fracture of surgical neck of right humerus, initial encounter          Subjective   Caritogo Anderson reports: A little sore today, 4/10 slept on it wrong. Doing fine with HEP at work. Getting better but still can't fix her own ponytail yet. Tolerated last visit well without issues.     Objective     DASH: to fill out next visit.    See Exercise, Manual, and Modality Logs for complete treatment.     Patient Education: scap back with shoulder ext and IR cane exercises, avoid thoracic kyphosis.    PROM:   Right Shoulder   Flexion: 90 degrees   Abduction: 80 degrees   External rotation 45°: 50 degrees   Internal rotation 45°: 60 degrees   Scaption 110     Assessment/Plan Pt demonstrating improved posture correction. Slow progression of PROM within tolerance, pulling at end ranges vs. pain. Pt tolerating therapy well with very slow progression of PROM/AAROM only due to delayed healing of bone.     Plan Goals: SHORT TERM GOALS:   1. Patient to be compliant with their initial HEP in 2 weeks - MET  2. Patient Independent with AAROM shoulder ex. Pulley or cane in 2 weeks - PARTIALLY MET (cane)  3. Patient has close to full PROM R Shoulder in 5 weeks - PARTIALLY MET  4. Patient to exhibit active shoulder flexion/abduction R AROM 100 or better to assist with overhead activities without pain in 5 weeks - PARTIALLY MET  5. IR R AROM to L1 R shoulder for LE dressing in 5 weeks - NOT MET  6. ER R AROM behind head for fixing and washing hair independently in 5 weeks- NOT MET     LONG TERM GOALS:   1. Pt score < 15 % perceived disability on DASH by D/C - NOT MET  2. Pt has functional R AROM shoulder flexion/abduction 130 without pain for reaching to cabinet- NOT MET  3. Pt has 4/5 UE R strength or better for chores around the house and ADLs by D/C- noT  MET  4. Pt to sleep with 75% less interference from R shoulder pain by D/C- PARTIALLY MET  5. Pt reports readiness for DC to Independent HEP for self management of condition.- NOT MET    Progress per Plan of Care cane flexion, scaption, abd, ER next visit, add to HEP.            Timed:         Manual Therapy:    20     mins  42015;     Therapeutic Exercise:    10     mins  59763;     Neuromuscular Edwardo:       mins  19949;    Therapeutic Activity:          mins  97902;     Gait Training:           mins  51674;     Ultrasound:          mins  66591;    Ionto                                   mins   96985  Self Care                            mins   65934  Canalith Repos                   mins  4209  Aquatic                               mins 75362    Un-Timed:  Electrical Stimulation:    15     mins  03154 ( );  Dry Needling          mins self-pay  Traction          mins 62459  Low Eval          Mins  82087  Mod Eval          Mins  36883  High Eval                            Mins  62033  Re-Eval                               mins  82246    Timed Treatment:   30   mins   Total Treatment:     45   mins    Chrystal Koroma PT, DPT  IN LICENCE: [id number]

## 2023-10-24 NOTE — PROGRESS NOTES
Physical Therapy Progress Note    [address] [facility] [place] [address]  [phone] (p)  [phone] (f)    VISIT#: 5     Diagnosis Plan   1. Closed 3-part fracture of surgical neck of right humerus, initial encounter          Subjective   Carito Anderson reports: A little sore today, 4/10 slept on it wrong. Doing fine with HEP at work. Getting better but still can't fix her own ponytail yet. Tolerated last visit well without issues.     Objective     DASH: to fill out next visit.    See Exercise, Manual, and Modality Logs for complete treatment.     Patient Education: scap back with shoulder ext and IR cane exercises, avoid thoracic kyphosis.    PROM:   Right Shoulder   Flexion: 90 degrees   Abduction: 80 degrees   External rotation 45°: 50 degrees   Internal rotation 45°: 60 degrees   Scaption 110     Assessment/Plan Pt demonstrating improved posture correction. Slow progression of PROM within tolerance, pulling at end ranges vs. pain. Pt tolerating therapy well with very slow progression of PROM/AAROM only due to delayed healing of bone.     Plan Goals: SHORT TERM GOALS:   1. Patient to be compliant with their initial HEP in 2 weeks - MET  2. Patient Independent with AAROM shoulder ex. Pulley or cane in 2 weeks - PARTIALLY MET (cane)  3. Patient has close to full PROM R Shoulder in 5 weeks - PARTIALLY MET  4. Patient to exhibit active shoulder flexion/abduction R AROM 100 or better to assist with overhead activities without pain in 5 weeks - PARTIALLY MET  5. IR R AROM to L1 R shoulder for LE dressing in 5 weeks - NOT MET  6. ER R AROM behind head for fixing and washing hair independently in 5 weeks- NOT MET     LONG TERM GOALS:   1. Pt score < 15 % perceived disability on DASH by D/C - NOT MET  2. Pt has functional R AROM shoulder flexion/abduction 130 without pain for reaching to cabinet- NOT MET  3. Pt has 4/5 UE R strength or better for chores around the house and ADLs by D/C- noT MET  4. Pt to sleep with  75% less interference from R shoulder pain by D/C- PARTIALLY MET  5. Pt reports readiness for DC to Independent HEP for self management of condition.- NOT MET    Progress per Plan of Care cane flexion, scaption, abd, ER next visit, add to HEP.            Timed:         Manual Therapy:    20     mins  95647;     Therapeutic Exercise:    10     mins  23061;     Neuromuscular Edwardo:       mins  16060;    Therapeutic Activity:          mins  55923;     Gait Training:           mins  46772;     Ultrasound:          mins  76260;    Ionto                                   mins   48463  Self Care                            mins   67599  Canalith Repos                   mins  4209  Aquatic                               mins 03563    Un-Timed:  Electrical Stimulation:    15     mins  92482 ( );  Dry Needling          mins self-pay  Traction          mins 17936  Low Eval          Mins  67242  Mod Eval          Mins  89426  High Eval                            Mins  01455  Re-Eval                               mins  62816    Timed Treatment:   30   mins   Total Treatment:     45   mins    Chrystal Koroma PT, DPT  IN LICENCE: [id number]

## 2023-10-30 ENCOUNTER — TREATMENT (OUTPATIENT)
Dept: PHYSICAL THERAPY | Facility: CLINIC | Age: 68
End: 2023-10-30
Payer: MEDICARE

## 2023-10-30 DIAGNOSIS — S42.231A CLOSED 3-PART FRACTURE OF SURGICAL NECK OF RIGHT HUMERUS, INITIAL ENCOUNTER: Primary | ICD-10-CM

## 2023-10-30 PROCEDURE — 97140 MANUAL THERAPY 1/> REGIONS: CPT | Performed by: PHYSICAL THERAPIST

## 2023-10-30 PROCEDURE — G0283 ELEC STIM OTHER THAN WOUND: HCPCS | Performed by: PHYSICAL THERAPIST

## 2023-10-30 PROCEDURE — 97110 THERAPEUTIC EXERCISES: CPT | Performed by: PHYSICAL THERAPIST

## 2023-10-30 NOTE — PROGRESS NOTES
Physical Therapy Treatment Note    [address] [facility] [place] [address]  [phone] (p)  [phone] (f)    VISIT#: 6     Diagnosis Plan   1. Closed 3-part fracture of surgical neck of right humerus, initial encounter          Subjective   Caritogo Anderson reports:no new issues, continues to tolerate therapy well, including manual. Still can't make ponytail on her own but getting closer    Objective     See Exercise, Manual, and Modality Logs for complete treatment.     Access Code: [email or web address]  URL: [email or web address]  Date: 10/31/2023  Prepared by: Chrystal Koroma    Exercises  - Standing Bilateral Shoulder Internal Rotation AAROM with Dowel  - 2 x daily - 7 x weekly - 1 sets - 10 reps  - Standing Shoulder Extension with Dowel  - 2 x daily - 7 x weekly - 1 sets - 10 reps  - Shoulder Scaption AAROM with Dowel  - 2 x daily - 7 x weekly - 1 sets - 10 reps  - Supine Shoulder Flexion AAROM with Dowel  - 2 x daily - 7 x weekly - 1 sets - 10 reps  - Supine Shoulder Abduction AROM  - 2 x daily - 7 x weekly - 1 sets - 10 reps  - Seated Scapular Retraction  - 2 x daily - 7 x weekly - 2 sets - 10 reps    Assessment/Plan PROM continues to improve, AAROM to painfree ranges only. Pt with rotator cuff weakness.       Progress per Plan of Care Continue to progress as noted. Clarify with MD referral  instruction for ROM only.            Timed:         Manual Therapy:    20     mins  92159;     Therapeutic Exercise:    10     mins  36390;     Neuromuscular Edwardo:        mins  53740;    Therapeutic Activity:          mins  20970;     Gait Training:           mins  97023;     Ultrasound:          mins  94310;    Ionto                                   mins   74565  Self Care                            mins   96301  Canalith Repos                   mins  4209  Aquatic                               mins 00291    Un-Timed:  Electrical Stimulation:    15     mins  33626 ( );  Dry Needling          mins self-pay  Traction           mins 27928  Low Eval          Mins  03703  Mod Eval          Mins  96285  High Eval                            Mins  07092  Re-Eval                               mins  20981    Timed Treatment:   30   mins   Total Treatment:     45   mins    Chrystal Koroma PT, DPT  IN LICENCE: [id number]

## 2023-11-01 ENCOUNTER — TREATMENT (OUTPATIENT)
Dept: PHYSICAL THERAPY | Facility: CLINIC | Age: 68
End: 2023-11-01
Payer: MEDICARE

## 2023-11-01 DIAGNOSIS — S42.231A CLOSED 3-PART FRACTURE OF SURGICAL NECK OF RIGHT HUMERUS, INITIAL ENCOUNTER: Primary | ICD-10-CM

## 2023-11-01 NOTE — PROGRESS NOTES
Physical Therapy Treatment Note    [address] [facility] [place] [address]  [phone] (p)  [phone] (f)    VISIT#: 7     Diagnosis Plan   1. Closed 3-part fracture of surgical neck of right humerus, initial encounter          Subjective   Carito Anderson reports: 1-2/10 R shoulder. Tolerated last visit without issues. No questions with HEP, doing fine with cane exercises. Estim continues to help.     Objective     See Exercise, Manual, and Modality Logs for complete treatment.     Patient Education: pt performing scaption AAROM cane incorrectly, mild discomfort, resolved with change in form. Cues for scap retraction with cane ex and IR.    Assessment/Plan Good tolerance to PROM, painfree end ranges all directions. Most limitation in IR direction    Progress per Plan of Care Continue to progress as tolerated.          Timed:         Manual Therapy:     18    mins  95932;     Therapeutic Exercise:     10    mins  01689;     Neuromuscular Edwardo:        mins  43870;    Therapeutic Activity:          mins  29167;     Gait Training:           mins  55360;     Ultrasound:          mins  85318;    Ionto                                   mins   44860  Self Care                            mins   47294  Canalith Repos                   mins  4209  Aquatic                               mins 43880    Un-Timed:  Electrical Stimulation:    15    mins  32149 ( );  Dry Needling          mins self-pay  Traction          mins 35017  Low Eval          Mins  30932  Mod Eval          Mins  14344  High Eval                            Mins  24601  Re-Eval                               mins  48190    Timed Treatment:   28   mins   Total Treatment:     43   mins    Chrystal Koroma PT, DPT  IN LICENCE: [id number]

## 2023-11-06 ENCOUNTER — TREATMENT (OUTPATIENT)
Dept: PHYSICAL THERAPY | Facility: CLINIC | Age: 68
End: 2023-11-06
Payer: MEDICARE

## 2023-11-06 DIAGNOSIS — S42.231A CLOSED 3-PART FRACTURE OF SURGICAL NECK OF RIGHT HUMERUS, INITIAL ENCOUNTER: Primary | ICD-10-CM

## 2023-11-06 PROCEDURE — 97110 THERAPEUTIC EXERCISES: CPT | Performed by: PHYSICAL THERAPIST

## 2023-11-06 PROCEDURE — 97140 MANUAL THERAPY 1/> REGIONS: CPT | Performed by: PHYSICAL THERAPIST

## 2023-11-06 PROCEDURE — G0283 ELEC STIM OTHER THAN WOUND: HCPCS | Performed by: PHYSICAL THERAPIST

## 2023-11-06 NOTE — PROGRESS NOTES
Physical Therapy Treatment Note    [address] [facility] [place] [address]  [phone] (p)  [phone] (f)    VISIT#: 8     Diagnosis Plan   1. Closed 3-part fracture of surgical neck of right humerus, initial encounter          Subjective   Carito Anderson reports: tried to sleep in bed last night, woke up laying on R side, sore. Going to go back to sleeping on couch. Tolerated last visit without issue    Objective     See Exercise, Manual, and Modality Logs for complete treatment.     Patient Education: posture correction.    Assessment/Plan Mild progression of AROM and AAROM. Pt able to reach R hip but not R shoulder.  Good tolerance to manual painfree all end ranges.     Progress per Plan of Care swiss ball on counter/ railing slides next visit.            Timed:         Manual Therapy:    20     mins  39317;     Therapeutic Exercise:    10     mins  26849;     Neuromuscular Edwardo:        mins  55966;    Therapeutic Activity:          mins  41413;     Gait Training:           mins  82779;     Ultrasound:          mins  39825;    Ionto                                   mins   55638  Self Care                            mins   56013  Canalith Repos                   mins  4209  Aquatic                               mins 82084    Un-Timed:  Electrical Stimulation:    15     mins  06471 ( );  Dry Needling          mins self-pay  Traction          mins 23783  Low Eval          Mins  87839  Mod Eval          Mins  94239  High Eval                            Mins  94905  Re-Eval                               mins  96797    Timed Treatment:   30   mins   Total Treatment:     45   mins    Chrystal Koroma PT, DPT  IN LICENCE: [id number]

## 2023-11-08 ENCOUNTER — TREATMENT (OUTPATIENT)
Dept: PHYSICAL THERAPY | Facility: CLINIC | Age: 68
End: 2023-11-08
Payer: MEDICARE

## 2023-11-08 DIAGNOSIS — S42.231A CLOSED 3-PART FRACTURE OF SURGICAL NECK OF RIGHT HUMERUS, INITIAL ENCOUNTER: Primary | ICD-10-CM

## 2023-11-08 NOTE — PROGRESS NOTES
Physical Therapy Treatment Note    [address] [facility] [place] [address]  [phone] (p)  [phone] (f)    VISIT#: 9     Diagnosis Plan   1. Closed 3-part fracture of surgical neck of right humerus, initial encounter          Subjective   Carito Anderson reports: no new issues, did fine last visit.    Objective     See Exercise, Manual, and Modality Logs for complete treatment.     Patient Education: PROM, AAROM, AROM within tolerance.     AROM R shoulder:   IR ~ L4,   ER ~ C7    Assessment/Plan Good tolerance to manual. Also mild AROM progression. Pt reports can almost reach back of head post treatment, getting closer to reaching ponytail goal.      Progress per Plan of Care Continue to progress as tolerated.            Timed:         Manual Therapy:    20     mins  36364;     Therapeutic Exercise:    10     mins  44207;     Neuromuscular Edwardo:        mins  35528;    Therapeutic Activity:          mins  79644;     Gait Training:           mins  61016;     Ultrasound:          mins  32060;    Ionto                                   mins   61877  Self Care                            mins   27128  Canalith Repos                   mins  4209  Aquatic                               mins 95529    Un-Timed:  Electrical Stimulation:    15     mins  99109 ( );  Dry Needling          mins self-pay  Traction          mins 64143  Low Eval          Mins  38267  Mod Eval          Mins  54488  High Eval                            Mins  83134  Re-Eval                               mins  03265    Timed Treatment:    30  mins   Total Treatment:     45   mins    Chrystal Koroma PT, DPT  IN LICENCE: [id number]

## 2023-11-13 ENCOUNTER — TREATMENT (OUTPATIENT)
Dept: PHYSICAL THERAPY | Facility: CLINIC | Age: 68
End: 2023-11-13
Payer: MEDICARE

## 2023-11-13 DIAGNOSIS — S42.231A CLOSED 3-PART FRACTURE OF SURGICAL NECK OF RIGHT HUMERUS, INITIAL ENCOUNTER: Primary | ICD-10-CM

## 2023-11-13 PROCEDURE — G0283 ELEC STIM OTHER THAN WOUND: HCPCS | Performed by: PHYSICAL THERAPIST

## 2023-11-13 PROCEDURE — 97140 MANUAL THERAPY 1/> REGIONS: CPT | Performed by: PHYSICAL THERAPIST

## 2023-11-13 PROCEDURE — 97110 THERAPEUTIC EXERCISES: CPT | Performed by: PHYSICAL THERAPIST

## 2023-11-13 NOTE — PROGRESS NOTES
Physical Therapy Treatment Note    [address] [facility] [place] [address]  [phone] (p)  [phone] (f)    VISIT#: 10     Diagnosis Plan   1. Closed 3-part fracture of surgical neck of right humerus, initial encounter          Subjective   Carito Anderson reports: 0-1/10    Objective     See Exercise, Manual, and Modality Logs for complete treatment.     Continue ROM only     Assessment/Plan Mild progression. Pt reports all exercises and manual without increased pain during, a little sore/tired after which resolved with modalities.    SHORT TERM GOALS:   1. Patient to be compliant with their initial HEP in 2 weeks - MET  2. Patient Independent with AAROM shoulder ex. Pulley or cane in 2 weeks -  MET (cane)  3. Patient has close to full PROM R Shoulder in 5 weeks - PARTIALLY MET  4. Patient to exhibit active shoulder flexion/abduction R AROM 100 or better to assist with overhead activities without pain in 5 weeks - PARTIALLY MET  5. IR R AROM to L1 R shoulder for LE dressing in 5 weeks - PARTIALLY MET  6. ER R AROM behind head for fixing and washing hair independently in 5 weeks- PARTIALLY MET     LONG TERM GOALS:   1. Pt score < 15 % perceived disability on DASH by D/C - NOT MET  2. Pt has functional R AROM shoulder flexion/abduction 130 without pain for reaching to cabinet- NOT MET  3. Pt has 4/5 UE R strength or better for chores around the house and ADLs by D/C- noT MET  4. Pt to sleep with 75% less interference from R shoulder pain by D/C- PARTIALLY MET  5. Pt reports readiness for DC to Independent HEP for self management of condition.- NOT MET    Progress per Plan of Care sidelying ER next visit. Pt to schedule MD follow up.            Timed:        Manual Therapy:    19     mins  08777;     Therapeutic Exercise:    10     mins  02759;     Neuromuscular Edwardo:        mins  32471;    Therapeutic Activity:          mins  99664;     Gait Training:           mins  46199;     Ultrasound:          mins  82752;     Ionto                                   mins   33706  Self Care                            mins   24244  Canalith Repos                    mins  4209  Aquatic                               mins 52396    Un-Timed:  Electrical Stimulation:    15     mins  72280 ( );  Dry Needling          mins self-pay  Traction          mins 31067  Low Eval          Mins  15185  Mod Eval          Mins  71335  High Eval                            Mins  73111  Re-Eval                               mins  81168    Timed Treatment:   29   mins   Total Treatment:     44   mins    Chrystal Koroma PT, DPT  IN LICENCE: [id number]

## 2023-11-20 ENCOUNTER — TREATMENT (OUTPATIENT)
Dept: PHYSICAL THERAPY | Facility: CLINIC | Age: 68
End: 2023-11-20
Payer: MEDICARE

## 2023-11-20 DIAGNOSIS — S42.231A CLOSED 3-PART FRACTURE OF SURGICAL NECK OF RIGHT HUMERUS, INITIAL ENCOUNTER: Primary | ICD-10-CM

## 2023-11-20 NOTE — LETTER
Physical Therapy Treatment Note    [address]   [place] [address]  [phone] (p)  [phone] (f)    VISIT#: 11     Diagnosis Plan   1. Closed 3-part fracture of surgical neck of right humerus, initial encounter          Subjective   Carito Anderson reports: has MD follow up up 12/4/23. 2/10 (ranges 2-3/10 to 5/10)  Notes improvement in her UE function overall, can open things, hold cup, dress, take seatbelt on off, shift and drive (drives manual) . Still can't reach or fix her hair into a ponytail. Still can't sleep on R side.     Objective       DASH: 48% dysfunction ( work 50%) - (61% and 50% at eval)  See Exercise, Manual, and Modality Logs for complete treatment.     Patient Education: reviewed HEP.    PROM R shoulder  ER75  IR 60   ER @ 90: 85  IR @ 90: 20  Scaption 130   Flex 130  Abd 90    AROM R shoulder  Abd  70 pain/discomfort  Flex 80 pain/discomfort   Ext 40   IR L5  ER ear mild discomfort.  AROM ear/back of head.       Lift off negative, full can negative, empty can not tested.     Strength: all painfree except mid discomfort with flexion; not tested to max at least 3+/5 IR and ER.     Assessment/Plan Good tolerance to PROM but pt with more capsular restriction present today, not able to move as far through PROM. PROM improvement, no significant AROM improvement R shoulder. Discussed status with pt, encourage pt to continue AAROM daily until then.     Progress per Plan of Care Hold PT until MD follow up. Pt to discuss possible imaging with MD.             Timed:         Manual Therapy:    30     mins  91018;     Therapeutic Exercise:    10     mins  37965;     Neuromuscular Edwardo:        mins  30072;    Therapeutic Activity:          mins  53617;     Gait Training:           mins  33705;     Ultrasound:          mins  31560;    Ionto                                   mins   79669  Self Care                            mins   50252  Canalith Repos                   mins  4209  Aquatic                                mins 83958    Un-Timed:  Electrical Stimulation:         mins  49115 ( );  Dry Needling          mins self-pay  Traction          mins 91322  Low Eval          Mins  48468  Mod Eval          Mins  43058  High Eval                            Mins  86752  Re-Eval                               mins  47797    Timed Treatment:   40   mins   Total Treatment:     55   mins    Chrystal Koroma PT, DPT  IN LICENCE: [id number]

## 2023-11-20 NOTE — PROGRESS NOTES
Physical Therapy Treatment Note    [address]   [place] [address]  [phone] (p)  [phone] (f)    VISIT#: 11     Diagnosis Plan   1. Closed 3-part fracture of surgical neck of right humerus, initial encounter          Subjective   Carito Anderson reports: has MD follow up up 12/4/23. 2/10 (ranges 2-3/10 to 5/10)  Notes improvement in her UE function overall, can open things, hold cup, dress, take seatbelt on off, shift and drive (drives manual) . Still can't reach or fix her hair into a ponytail. Still can't sleep on R side.     Objective       DASH: 48% dysfunction ( work 50%) - (61% and 50% at eval)  See Exercise, Manual, and Modality Logs for complete treatment.     Patient Education: reviewed HEP.    PROM R shoulder  ER75  IR 60   ER @ 90: 85  IR @ 90: 20  Scaption 130   Flex 130  Abd 90    AROM R shoulder  Abd  70 pain/discomfort  Flex 80 pain/discomfort   Ext 40   IR L5  ER ear mild discomfort.  AROM ear/back of head.       Lift off negative, full can negative, empty can not tested.     Strength: all painfree except mid discomfort with flexion; not tested to max at least 3+/5 IR and ER.     Assessment/Plan Good tolerance to PROM but pt with more capsular restriction present today, not able to move as far through PROM. PROM improvement, no significant AROM improvement R shoulder. Discussed status with pt, encourage pt to continue AAROM daily until then.     Progress per Plan of Care Hold PT until MD follow up. Pt to discuss possible imaging with MD.             Timed:         Manual Therapy:    30     mins  02011;     Therapeutic Exercise:    10     mins  00074;     Neuromuscular Edwardo:        mins  83406;    Therapeutic Activity:          mins  90011;     Gait Training:           mins  18917;     Ultrasound:          mins  99818;    Ionto                                   mins   27123  Self Care                            mins   02664  Canalith Repos                   mins  4209  Aquatic                                mins 95488    Un-Timed:  Electrical Stimulation:         mins  16738 ( );  Dry Needling          mins self-pay  Traction          mins 66382  Low Eval          Mins  29442  Mod Eval          Mins  40342  High Eval                            Mins  65372  Re-Eval                               mins  45841    Timed Treatment:   40   mins   Total Treatment:     55   mins    Chrystal Koroma PT, DPT  IN LICENCE: [id number]

## 2023-12-04 ENCOUNTER — OFFICE VISIT (OUTPATIENT)
Dept: ORTHOPEDIC SURGERY | Facility: CLINIC | Age: 68
End: 2023-12-04
Payer: MEDICARE

## 2023-12-04 VITALS — HEIGHT: 60 IN | WEIGHT: 200 LBS | BODY MASS INDEX: 39.27 KG/M2

## 2023-12-04 DIAGNOSIS — S42.231A CLOSED 3-PART FRACTURE OF SURGICAL NECK OF RIGHT HUMERUS, INITIAL ENCOUNTER: Primary | ICD-10-CM

## 2023-12-04 PROCEDURE — 99213 OFFICE O/P EST LOW 20 MIN: CPT | Performed by: FAMILY MEDICINE

## 2023-12-04 PROCEDURE — 1159F MED LIST DOCD IN RCRD: CPT | Performed by: FAMILY MEDICINE

## 2023-12-04 PROCEDURE — 1160F RVW MEDS BY RX/DR IN RCRD: CPT | Performed by: FAMILY MEDICINE

## 2023-12-04 NOTE — PROGRESS NOTES
Primary Care Sports Medicine Office Visit Note     Patient ID: Carito Anderson is a 68 y.o. female.    Chief Complaint:  Chief Complaint   Patient presents with    Right Shoulder - Follow-up, Pain     HPI:    Ms. Carito Anderson is a 68 y.o. female. The patient presents to the clinic today for follow up evaluation of right shoulder injury.    The patient reports that her right shoulder motion is still tough. Her pain is improving, and her range of motion has improved. Her initial injury occurred on 07/08/2023. She cannot fix her hair, and she is right hand dominant. Her strength is better than when she first injured her right shoulder.    Past Medical History:   Diagnosis Date    Anxiety     Arthritis     Bowel incontinence     Depression     Diarrhea     GERD (gastroesophageal reflux disease)     Hx of migraines     Hyperlipidemia     Leg cramps     Memory deficit        Past Surgical History:   Procedure Laterality Date    APPENDECTOMY      CHOLECYSTECTOMY      HYSTERECTOMY      KNEE SURGERY Right     arthritis    MASS EXCISION Right 1/25/2021    Procedure: MASS SOFT TISSUE EXCISION;  Surgeon: Kayden Goldsmith DO;  Location: [facility];  Service: General;  Laterality: Right;    NASAL SEPTUM SURGERY      ROTATOR CUFF REPAIR Right        Family History   Problem Relation Age of Onset    No Known Problems Mother     No Known Problems Father     No Known Problems Sister     No Known Problems Brother     No Known Problems Maternal Aunt     No Known Problems Maternal Uncle     No Known Problems Paternal Aunt     No Known Problems Paternal Uncle     No Known Problems Maternal Grandmother     No Known Problems Maternal Grandfather     No Known Problems Paternal Grandmother     No Known Problems Paternal Grandfather     No Known Problems Other     Anesthesia problems Neg Hx     Broken bones Neg Hx     Cancer Neg Hx     Clotting disorder Neg Hx     Collagen disease Neg Hx     Diabetes Neg Hx     Dislocations Neg Hx      "Osteoporosis Neg Hx     Rheumatologic disease Neg Hx     Scoliosis Neg Hx     Severe sprains Neg Hx      Social History     Occupational History    Not on file   Tobacco Use    Smoking status: Never     Passive exposure: Never    Smokeless tobacco: Never   Vaping Use    Vaping Use: Never used   Substance and Sexual Activity    Alcohol use: Not Currently    Drug use: Never    Sexual activity: Defer      Review of Systems   Constitutional:  Negative for activity change and fever.   Musculoskeletal:  Positive for arthralgias.   Skin:  Negative for color change and rash.   Neurological:  Negative for weakness.     Objective:    Ht 152.4 cm (60\")   Wt 90.7 kg (200 lb)   BMI 39.06 kg/m²     Physical Examination:  Physical Exam  Vitals and nursing note reviewed.   Constitutional:       General: She is not in acute distress.     Appearance: She is well-developed. She is not diaphoretic.   HENT:      Head: Normocephalic and atraumatic.   Eyes:      Conjunctiva/sclera: Conjunctivae normal.   Pulmonary:      Effort: Pulmonary effort is normal. No respiratory distress.   Skin:     General: Skin is warm.      Capillary Refill: Capillary refill takes less than 2 seconds.   Neurological:      Mental Status: She is alert.       Right Shoulder Exam     Range of Motion   Right shoulder passive abduction: Passively, she is mildly better than above with near 90 degrees of forward flexion and lateral abduction both.   Right shoulder external rotation: mildly decreased to about 45 degrees.   Forward flexion:  70   Internal rotation 0 degrees:  normal   Internal rotation 90 degrees:  normal     Comments:  Moderately decreased active range of motion to about 80 degrees laterally. Special tests are difficult due to decreased active range of motion.          Imaging and other tests:  Repeat three view x-rays of the proximal humerus today/shoulder reveal mild pseudoarthrosis of inferior acromion and humeral head. This is mildly worsening " from previous. Otherwise, very little considerable healing of previously noted fracture of the humeral head, nondisplaced.    Assessment and Plan:    1. Closed 3-part fracture of surgical neck of right humerus, subsequent encounter with delayed healing.  - XR Shoulder 2+ View Right  - Ambulatory Referral to Physical Therapy Evaluate and treat; Stretching, ROM, Strengthening    The patient is doing well today with decreased symptomatology and pain consistently in the last few visits. Although she does have a mild decrease in range of motion, this continues to improve with physical therapy as well. At this point, a new physical therapy order was placed. Otherwise, we will follow along physical therapy notes, return to clinic to me as needed.    Transcribed from ambient dictation for Christopher Braswell II, DO by Alejandra Tavares.  12/04/23   15:09 EST    Patient or patient representative verbalized consent to the visit recording.  I have personally performed the services described in this document as transcribed by the above individual, and it is both accurate and complete.    Disclaimer: Please note that areas of this note were completed with computer voice recognition software.  Quite often unanticipated grammatical, syntax, homophones, and other interpretive errors are inadvertently transcribed by the computer software. Please excuse any errors that have escaped final proofreading.

## 2023-12-20 ENCOUNTER — TREATMENT (OUTPATIENT)
Dept: PHYSICAL THERAPY | Facility: CLINIC | Age: 68
End: 2023-12-20
Payer: MEDICARE

## 2023-12-20 DIAGNOSIS — S42.231A CLOSED 3-PART FRACTURE OF SURGICAL NECK OF RIGHT HUMERUS, INITIAL ENCOUNTER: Primary | ICD-10-CM

## 2023-12-20 NOTE — PROGRESS NOTES
"Physical Therapy Progress Note    [address] [facility] [place] [address]  [phone] (p)  [phone] (f)    VISIT#: 12/20     Diagnosis Plan   1. Closed 3-part fracture of surgical neck of right humerus, initial encounter          Subjective   Carito Anderson reports: 2-3/10. Still delayed healing of fx. Wants her to do more PT, new order Doing HEP no new issues. Didn't get message from family member about scheduling PT appts, delayed as result ~ 1 month since last visit.    Objective     See Exercise, Manual, and Modality Logs for complete treatment.     See new order/referral  Spoke to MD re: epic chat re: imaging. Delayed bone healing but ok at this point to begin light strengthening within tolerance.    Patient Education: continue AAROM daily, strengthening 3x/wk to fatigue.    No significant change in DASH or PROM/AROM from last reassess.    Access Code: [email or web address]  URL: [email or web address]  Date: 12/20/2023  Prepared by: Chrystal Koroma    Exercises  - Standing Row with Anchored Resistance  - 1 x daily - 3 x weekly - 2 sets - 10 reps  - Shoulder extension with resistance - Neutral  - 1 x daily - 3 x weekly - 2 sets - 10 reps  - Shoulder External Rotation with Anchored Resistance  - 1 x daily - 3 x weekly - 2 sets - 10 reps  - Shoulder Internal Rotation with Resistance  - 1 x daily - 3 x weekly - 2 sets - 10 reps  - Standing Tricep Extensions with Resistance  - 1 x daily - 3 x weekly - 2 sets - 10 reps  - Seated Single Arm Elbow Flexion with Resistance  - 1 x daily - 3 x weekly - 2 sets - 10 reps  - Standing Shoulder Scaption Wall Walk  - 2 x daily - 7 x weekly - 1 sets - 10 reps - 10\" hold    Assessment/Plan Good tolerance to gentle strengthening all without increased pain. Pt able to maintain AAROM pretty well with HEP, no significant decrease in PROM with delay in therapy. Good tolerance to manual. Plan to continue PT.    Plan Goals: SHORT TERM GOALS:   1. Patient to be compliant with their initial HEP " in 2 weeks - MET  2. Patient Independent with AAROM shoulder ex. Pulley or cane in 2 weeks - MET  3. Patient has close to full PROM R Shoulder in 5 weeks - PARTIALLY MET  4. Patient to exhibit active shoulder flexion/abduction R AROM 100 or better to assist with overhead activities without pain in 5 weeks - NOT MET  5. IR R AROM to L1 R shoulder for LE dressing in 5 weeks - PARTIALLY MET  6. ER R AROM behind head for fixing and washing hair independently in 5 weeks     LONG TERM GOALS:   1. Pt score < 15 % perceived disability on DASH by D/C  2. Pt has functional R AROM shoulder flexion/abduction 130 without pain for reaching to cabinet  3. Pt has 4/5 UE R strength or better for chores around the house and ADLs by D/C  4. Pt to sleep with 75% less interference from R shoulder pain by D/C  5. Pt reports readiness for DC to Independent [facility] for self management of condition.     Progress per Plan of Care Monitor and progress as tolerated. Insurance auth date could not be extended. Will need new auth next visit.            Timed:         Manual Therapy:    15     mins  87510;     Therapeutic Exercise:    25     mins  38948;     Neuromuscular Edwardo:        mins  60424;    Therapeutic Activity:          mins  06263;     Gait Training:           mins  18607;     Ultrasound:          mins  91570;    Ionto                                   mins   85342  Self Care                            mins   04755  Canalith Repos                   mins  4209  Aquatic                               mins 66797    Un-Timed:  Electrical Stimulation:   15      mins  36762 ( );  Dry Needling          mins self-pay  Traction          mins 26614  Low Eval          Mins  21023  Mod Eval          Mins  36511  High Eval                            Mins  57958  Re-Eval                               mins  68294    Timed Treatment:   40   mins   Total Treatment:     55   mins    Chrystal Koroma PT, DPT  IN LICENCE: [id number]

## 2023-12-20 NOTE — LETTER
"Carito Anderson  [birth year]    Physical Therapy Progress Note    [address]   [place] [address]  [phone] (p)  [phone] (f)    VISIT#: 12/20     Diagnosis Plan   1. Closed 3-part fracture of surgical neck of right humerus, initial encounter          Subjective   Carito Anderson reports: 2-3/10. Still delayed healing of fx. Wants her to do more PT, new order Doing HEP no new issues. Didn't get message from family member about scheduling PT appts, delayed as result ~ 1 month since last visit.    Objective     See Exercise, Manual, and Modality Logs for complete treatment.     See new order/referral  Spoke to MD re: epic chat re: imaging. Delayed bone healing but ok at this point to begin light strengthening within tolerance.    Patient Education: continue AAROM daily, strengthening 3x/wk to fatigue.    No significant change in DASH or PROM/AROM from last reassess.    Access Code: [email or web address]  URL: [email or web address]  Date: 12/20/2023  Prepared by: Chrystal Koroma    Exercises  - Standing Row with Anchored Resistance  - 1 x daily - 3 x weekly - 2 sets - 10 reps  - Shoulder extension with resistance - Neutral  - 1 x daily - 3 x weekly - 2 sets - 10 reps  - Shoulder External Rotation with Anchored Resistance  - 1 x daily - 3 x weekly - 2 sets - 10 reps  - Shoulder Internal Rotation with Resistance  - 1 x daily - 3 x weekly - 2 sets - 10 reps  - Standing Tricep Extensions with Resistance  - 1 x daily - 3 x weekly - 2 sets - 10 reps  - Seated Single Arm Elbow Flexion with Resistance  - 1 x daily - 3 x weekly - 2 sets - 10 reps  - Standing Shoulder Scaption Wall Walk  - 2 x daily - 7 x weekly - 1 sets - 10 reps - 10\" hold    Assessment/Plan Good tolerance to gentle strengthening all without increased pain. Pt able to maintain AAROM pretty well with HEP, no significant decrease in PROM with delay in therapy. Good tolerance to manual. Plan to continue PT.    Plan Goals: SHORT TERM GOALS:   1. Patient to be " compliant with their initial HEP in 2 weeks - MET  2. Patient Independent with AAROM shoulder ex. Pulley or cane in 2 weeks - MET  3. Patient has close to full PROM R Shoulder in 5 weeks - PARTIALLY MET  4. Patient to exhibit active shoulder flexion/abduction R AROM 100 or better to assist with overhead activities without pain in 5 weeks - NOT MET  5. IR R AROM to L1 R shoulder for LE dressing in 5 weeks - PARTIALLY MET  6. ER R AROM behind head for fixing and washing hair independently in 5 weeks     LONG TERM GOALS:   1. Pt score < 15 % perceived disability on DASH by D/C  2. Pt has functional R AROM shoulder flexion/abduction 130 without pain for reaching to cabinet  3. Pt has 4/5 UE R strength or better for chores around the house and ADLs by D/C  4. Pt to sleep with 75% less interference from R shoulder pain by D/C  5. Pt reports readiness for DC to Independent HEP for self management of condition.     Progress per Plan of Care Monitor and progress as tolerated. Insurance auth date could not be extended. Will need new auth next visit.            Timed:         Manual Therapy:    15     mins  49733;     Therapeutic Exercise:    25     mins  37690;     Neuromuscular Edwardo:        mins  35354;    Therapeutic Activity:          mins  43285;     Gait Training:           mins  96817;     Ultrasound:          mins  69452;    Ionto                                   mins   70587  Self Care                            mins   79100  Canalith Repos                   mins  4209  Aquatic                               mins 42284    Un-Timed:  Electrical Stimulation:   15      mins  85721 ( );  Dry Needling          mins self-pay  Traction          mins 19573  Low Eval          Mins  49910  Mod Eval          Mins  10047  High Eval                            Mins  96262  Re-Eval                               mins  04760    Timed Treatment:   40   mins   Total Treatment:     55   mins    Chrystal Koroma PT, DPT  IN LICENCE:  [id number]

## 2023-12-22 NOTE — PROGRESS NOTES
Re-Assessment / Re-Certification      [address] [facility] [place] [address]  [phone] (p)  [phone] (f)    Patient: Carito Anderson   : 1955  Diagnosis/ICD-10 Code:  Closed 3-part fracture of surgical neck of right humerus, initial encounter [S42.231A]  Referring practitioner: Christopher Braswell I*  Date of Initial Visit: Type: THERAPY  Noted: 2023  Today's Date: 23  Patient seen for 12 sessions      Subjective:   Carito Anderson reports: 2-3/10. Still delayed healing of fx. Wants her to do more PT, new order.  Doing HEP no new issues since last visit. Didn't get message from family member about scheduling PT appts, delayed as result ~ 1 month since last visit.   Subjective Questionnaire: QuickDASH:  48% dysfunction ( work 50%) - (61% and 50% at eval)   Clinical Progress: improved  Home Program Compliance: Yes  Treatment has included: therapeutic exercise, manual therapy, and electrical stimulation    Subjective   Objective     Spoke to MD re: epic chat re: imaging. Delayed bone healing but ok at this point to begin light strengthening within tolerance.     Patient Education: continue AAROM daily, strengthening 3x/wk to fatigue.     No significant change in DASH or PROM/AROM from last reassess.     PROM R shoulder  ER75  IR 60   ER @ 90: 85  IR @ 90: 20  Scaption 130   Flex 130  Abd 95     AROM R shoulder  Abd  70 pain/discomfort  Flex 80 pain/discomfort   Ext 40   IR L5  ER ear mild discomfort.  AROM ear/back of head.     Access Code: [email or web address]  URL: [email or web address]  Date: 2023  Prepared by: Chrystal Koroma     Exercises  - Standing Row with Anchored Resistance  - 1 x daily - 3 x weekly - 2 sets - 10 reps  - Shoulder extension with resistance - Neutral  - 1 x daily - 3 x weekly - 2 sets - 10 reps  - Shoulder External Rotation with Anchored Resistance  - 1 x daily - 3 x weekly - 2 sets - 10 reps  - Shoulder Internal Rotation with Resistance  - 1 x daily - 3 x weekly - 2  "sets - 10 reps  - Standing Tricep Extensions with Resistance  - 1 x daily - 3 x weekly - 2 sets - 10 reps  - Seated Single Arm Elbow Flexion with Resistance  - 1 x daily - 3 x weekly - 2 sets - 10 reps  - Standing Shoulder Scaption Wall Walk  - 2 x daily - 7 x weekly - 1 sets - 10 reps - 10\" hold    Assessment/Plan  Progress toward previous goals: Partially Met    Good tolerance to gentle strengthening all without increased pain. Pt able to maintain AAROM pretty well with HEP, no significant decrease in PROM with delay in therapy. Good tolerance to manual. Plan to continue PT.     Plan Goals: SHORT TERM GOALS:   1. Patient to be compliant with their initial HEP in 2 weeks - MET  2. Patient Independent with AAROM shoulder ex. Pulley or cane in 2 weeks - MET  3. Patient has close to full PROM R Shoulder in 5 weeks - PARTIALLY MET  4. Patient to exhibit active shoulder flexion/abduction R AROM 100 or better to assist with overhead activities without pain in 5 weeks - NOT MET  5. IR R AROM to L1 R shoulder for LE dressing in 5 weeks - PARTIALLY MET  6. ER R AROM behind head for fixing and washing hair independently in 5 weeks     LONG TERM GOALS:   1. Pt score < 15 % perceived disability on DASH by D/C - PARTIALLY MET  2. Pt has functional R AROM shoulder flexion/abduction 130 without pain for reaching to cabinet -   3. Pt has 4/5 UE R strength or better for chores around the house and ADLs by D/C - NOT MET  4. Pt to sleep with 75% less interference from R shoulder pain by D/C  5. Pt reports readiness for DC to Independent HEP for self management of condition.      Recommendations: Continue as planned, pt with 9 remaining visits in current POC  Timeframe: 12 weeks  Prognosis to achieve goals: good    PT Signature: Chrystal Koroma PT, DPT  IN LICENCE: [id number]    Electronically signed by Chrystal Koroma PT, 12/22/23, 12:42 PM EST    Certification Period: 12/20/23 thru 3/18/2023  I certify that the therapy services are " furnished while this patient is under my care.  The services outlined above are required by this patient, and will be reviewed every 90 days.    Based upon review of the patient's progress and continued therapy plan, it is my medical opinion that Carito Anderson should continue physical therapy treatment at [facility]  [facility] PHYSICAL THERAPY  [address] [place] [address] [place] [address] [phone].    Signature: __________________________________  Christopher Braswell II, DO    Please sign and return via fax to [phone]. Thank you, [facility]    Timed:         Manual Therapy:  15       mins  50068;     Therapeutic Exercise:    25     mins  63593;     Neuromuscular Edwardo:        mins  26739;    Therapeutic Activity:          mins  01410;     Gait Training:           mins  60808;     Ultrasound:          mins  82309;    Ionto                                   mins   79373  Self Care                            mins   53750  Aquatic                               mins 44163      Un-Timed:  Electrical Stimulation:    15     mins  85290 ( );  Dry Needling          mins self-pay  Traction          mins 55586  Low Eval          Mins  87140  Mod Eval          Mins  17736  High Eval                            Mins  79529  Re-Eval                               mins  50883      Timed Treatment:   40   mins   Total Treatment:     55   mins

## 2024-01-02 ENCOUNTER — TREATMENT (OUTPATIENT)
Dept: PHYSICAL THERAPY | Facility: CLINIC | Age: 69
End: 2024-01-02
Payer: MEDICARE

## 2024-01-02 DIAGNOSIS — S42.231A CLOSED 3-PART FRACTURE OF SURGICAL NECK OF RIGHT HUMERUS, INITIAL ENCOUNTER: Primary | ICD-10-CM

## 2024-01-02 NOTE — PROGRESS NOTES
Physical Therapy Treatment Note    [address]   [place] [address]  [phone] (p)  [phone] (f)    VISIT#: 13     Diagnosis Plan   1. Closed 3-part fracture of surgical neck of right humerus, initial encounter          Subjective   Carito Anderson reports: slow improvement, but still painful - about 4/10 today due to sleeping on R side.       Objective       DASH: 48% dysfunction ( work 50%) - (61% and 50% at eval)  See Exercise, Manual, and Modality Logs for complete treatment.     Patient Education: reviewed HEP.    PROM R shoulder  ER75  IR 60   ER @ 90: 85  IR @ 90: 20  Scaption 130   Flex 130  Abd 90    AROM R shoulder  Abd  70 pain/discomfort  Flex 80 pain/discomfort   Ext 40   IR L5  ER ear mild discomfort.  AROM ear/back of head.       Lift off negative, full can negative, empty can not tested.     Strength: all painfree except mid discomfort with flexion; not tested to max at least 3+/5 IR and ER.     Assessment/Plan continued current POC and send authorization to insurance requesting additional visits - slow ans consistent improvement. Able to tolerate light strengthening and dynamic stabilization without pain .    Progress per Plan of Care           Timed:         Manual Therapy:    10     mins  80105;     Therapeutic Exercise:    15     mins  68894;     Neuromuscular Edwardo:        mins  47585;    Therapeutic Activity:          mins  86895;     Gait Training:           mins  41766;     Ultrasound:          mins  03388;    Ionto                                   mins   84557  Self Care                            mins   38969  Canalith Repos                   mins  4209  Aquatic                               mins 45513    Un-Timed:  Electrical Stimulation:    15     mins  76136 ( );  Dry Needling          mins self-pay  Traction          mins 51706  Low Eval          Mins  82431  Mod Eval          Mins  39799  High Eval                            Mins  20966  Re-Eval                                mins  32822    Timed Treatment:  25   mins   Total Treatment:     40   mins    Singh Georges PT, MALENA  IN LICENCE: [id number]

## 2024-01-16 ENCOUNTER — TREATMENT (OUTPATIENT)
Dept: PHYSICAL THERAPY | Facility: CLINIC | Age: 69
End: 2024-01-16
Payer: MEDICARE

## 2024-01-16 DIAGNOSIS — S42.231A CLOSED 3-PART FRACTURE OF SURGICAL NECK OF RIGHT HUMERUS, INITIAL ENCOUNTER: Primary | ICD-10-CM

## 2024-01-16 NOTE — PROGRESS NOTES
Physical Therapy Treatment Note    [address] [facility] [place] [address]  [phone] (p)  [phone] (f)    VISIT#: 14     Diagnosis Plan   1. Closed 3-part fracture of surgical neck of right humerus, initial encounter          Subjective   Carito Anderson reports: 5/10 upon arrival, stiff due to cold weather. No new issues. She continues to tolerate HEP and therapy well. Pt requests estim.    Objective     See Exercise, Manual, and Modality Logs for complete treatment.     Impingement end range flexion and Y so deferred. Scaption and otherwise well tolerated.    Assessment/Plan 2/10 post treatment, better. Pt tolerating ROM and strengthening, though had to modify flexion today due to impingement. Not making significant improvement in AROM, will discuss with MD.     Progress per Plan of Care Recommended pt also schedule follow up with MD, pt reports does not think she is supposed to follow up, will send REach message.             Timed:         Manual Therapy:    20     mins  93161;     Therapeutic Exercise:    10     mins  84603;     Neuromuscular Edwardo:        mins  09755;    Therapeutic Activity:          mins  98712;     Gait Training:           mins  25074;     Ultrasound:         mins  96976;    Ionto                                   mins   44820  Self Care                            mins   78119  Canalith Repos                   mins  4209  Aquatic                               mins 11905    Un-Timed:  Electrical Stimulation:    15     mins  09809 ( );  Dry Needling          mins self-pay  Traction          mins 51437  Low Eval          Mins  20556  Mod Eval          Mins  79457  High Eval                            Mins  36885  Re-Eval                               mins  60246    Timed Treatment:   30   mins   Total Treatment:     45   mins    Chrystal Koroma PT, DPT  IN LICENCE: [id number]

## 2024-01-18 ENCOUNTER — TREATMENT (OUTPATIENT)
Dept: PHYSICAL THERAPY | Facility: CLINIC | Age: 69
End: 2024-01-18
Payer: MEDICARE

## 2024-01-18 DIAGNOSIS — S42.231A CLOSED 3-PART FRACTURE OF SURGICAL NECK OF RIGHT HUMERUS, INITIAL ENCOUNTER: Primary | ICD-10-CM

## 2024-01-18 NOTE — PROGRESS NOTES
Physical Therapy Treatment Note    [address] [facility] [place] [address]  [phone] (p)  [phone] (f)    VISIT#: 15     Diagnosis Plan   1. Closed 3-part fracture of surgical neck of right humerus, initial encounter          Subjective   Carito Anderson reports: Pt notes pain 5/10, achey. No significant improvement in AROM, overall she feels like her pain is a little worse past few weeks during active movement, does not feel is due to strengthening exercises, either feels better or same after except wall walk. She feels is due to cold weather. She felt a lot better after last PT visit.    Objective     See Exercise, Manual, and Modality Logs for complete treatment.     Patient Education: pt instructed to hold off on wall walk at home.    PROM: ER @ scaption 78  ER @ 0 WFL  ER at 90: 20  IR at scaption 60  IR at 90: 25  Abd 85  Flex 125  Scapt 115  Ext not measured    Strength not tested  AROM not tested    Assessment/Plan Mild decrease in PROM from previous re: eval, message sent to MD and pt to call to schedule follow up visit. Good tolerance to manual and therex, feeling better after modalities. No impingement end range flexion today.     Progress per Plan of Care            Timed:         Manual Therapy:    18     mins  06904;     Therapeutic Exercise:    23     mins  89083;     Neuromuscular Edwardo:        mins  59902;    Therapeutic Activity:          mins  94175;     Gait Training:           mins  94322;     Ultrasound:          mins  74845;    Ionto                                   mins   97410  Self Care                            mins   45280  Canalith Repos                   mins  4209  Aquatic                               mins 40280    Un-Timed:  Electrical Stimulation:    15     mins  04762 ( );  Dry Needling          mins self-pay  Traction          mins 33704  Low Eval          Mins  66776  Mod Eval          Mins  33994  High Eval                            Mins  36447  Re-Eval                                mins  59154    Timed Treatment:    41  mins   Total Treatment:     56   mins    Chrystal Koroma PT, DPT  IN LICENCE: [id number]

## 2024-01-23 ENCOUNTER — TREATMENT (OUTPATIENT)
Dept: PHYSICAL THERAPY | Facility: CLINIC | Age: 69
End: 2024-01-23
Payer: MEDICARE

## 2024-01-23 DIAGNOSIS — S42.231A CLOSED 3-PART FRACTURE OF SURGICAL NECK OF RIGHT HUMERUS, INITIAL ENCOUNTER: Primary | ICD-10-CM

## 2024-01-23 NOTE — PROGRESS NOTES
Physical Therapy Treatment Note    [address] [facility] [place] [address]  [phone] (p)  [phone] (f)    VISIT#: 16     Diagnosis Plan   1. Closed 3-part fracture of surgical neck of right humerus, initial encounter          Subjective   Carito Anderson reports: Pt reports pain 4/10. She reels that the cold weather was contributing factor last week. Heating pad helps, got one for home. Also accidentally lifts file box at work with R arm. She scheduled MD follow up for Feb 13. 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     Objective     See Exercise, Manual, and Modality Logs for complete treatment.     Patient Education: avoid thoracic flexion compensation.    Assessment/Plan Good tolerance to PROM, including addition of gentle posterior capsule stretch. No impingement today. Able to tolerate addition of 1# weight for sidelying ER. Continues to tolerate partially elevated with wedge UE strengthening, all painfree.     Progress per Plan of Care supine full range flexion next visit as tolerated.            Timed:         Manual Therapy:    16     mins  26356;     Therapeutic Exercise:    25     mins  76280;     Neuromuscular Edwardo:        mins  13455;    Therapeutic Activity:          mins  48107;     Gait Training:           mins  32169;     Ultrasound:          mins  61254;    Ionto                                   mins   35492  Self Care                            mins   07760  Canalith Repos                   mins  4209  Aquatic                               mins 24960    Un-Timed:  Electrical Stimulation:    15     mins  56409 ( );  Dry Needling          mins self-pay  Traction          mins 35774  Low Eval          Mins  30346  Mod Eval          Mins  46049  High Eval                            Mins  40480  Re-Eval                               mins  17119    Timed Treatment:   41   mins   Total Treatment:     56    mins    Chrystal Koroma PT, DPT  IN LICENCE: [id number]

## 2024-01-30 ENCOUNTER — TREATMENT (OUTPATIENT)
Dept: PHYSICAL THERAPY | Facility: CLINIC | Age: 69
End: 2024-01-30
Payer: MEDICARE

## 2024-01-30 DIAGNOSIS — S42.231A CLOSED 3-PART FRACTURE OF SURGICAL NECK OF RIGHT HUMERUS, INITIAL ENCOUNTER: Primary | ICD-10-CM

## 2024-01-30 NOTE — LETTER
"Caritojane Anderson  [birth year]    Physical Therapy Treatment Note    [address] [facility] [address] [place] [address]  [phone] (p)  [phone] (f)    VISIT#: [id number]     Diagnosis Plan   1. Closed 3-part fracture of surgical neck of right humerus, initial encounter          Subjective   Carito Anderson reports: Neck tight and stiff, slept wrong. Shoulder 4/10. Doing fine with Hep, taking out wall slides helped. Still has not reached goal of being able to fix her hair in ponytail independently. She is frustrated with slow healing of bone and lack of functional improvement of her arm, plans to discuss with MD on Friday.    Objective     See Exercise, Manual, and Modality Logs for complete treatment.     Patient Education: reviewed posterior capsule stretch.     Quick DASH: 72% dysfunction, 56% work subscale    R shoulder PROM  Flex 115  Abd 90  IR 65  ER 85  ER and IR @ 90 not measured  Scaption 115    AROM   Flex 85,   ER back of head  IR L5  Abd 60     UE strength: 4-/5 IR and ER and ext, 3/5 flex and abd, elbow flex and ext 4-/5 all painfree.     Restriction posterior and inferior capsule. Tight lateral border scap/teres/lat.    Access Code: [email or web address]  URL: [email or web address]  Date: 01/31/2024  Prepared by: Chrystal Koroma    Exercises  - Sidelying Shoulder External Rotation Dumbbell  - 1 x daily - 7 x weekly - 2 sets - 10 reps  - Supine Cross Body Shoulder Stretch  - 1 x daily - 7 x weekly - 3 sets - 1 reps - 20\" hold  - Supine Elbow Flexion Extension AROM  - 1 x daily - 7 x weekly - 3 sets - 1 reps - 20\" hold  - Supine Chest Stretch with Elbows Bent  - 1 x daily - 7 x weekly - 3 sets - 10 reps  -Self release lateral border scap with tennis ball, supine    Assessment/Plan Pt with mild decrease in PROM flex and abd, mild increase IR and ER. Mild improvement in flexion AROM, otherwise no improvement. Mild improvement in strength, painfree resisted testing. Pt rated quick DASH worse today, pt reports previously took both arms " into account.     Plan Goals: SHORT TERM GOALS:   1. Patient to be compliant with their initial HEP in 2 weeks - MET  2. Patient Independent with AAROM shoulder ex. Pulley or cane in 2 weeks - MET  3. Patient has close to full PROM R Shoulder in 5 weeks - PARTIALLY MET  4. Patient to exhibit active shoulder flexion/abduction R AROM 100 or better to assist with overhead activities without pain in 5 weeks - NOT MET  5. IR R AROM to L1 R shoulder for LE dressing in 5 weeks - PARTIALLY MET  6. ER R AROM behind head for fixing and washing hair independently in 5 weeks- PARTIALLY MET     LONG TERM GOALS:   1. Pt score < 15 % perceived disability on DASH by D/C - PARTIALLY MET  2. Pt has functional R AROM shoulder flexion/abduction 130 without pain for reaching to cabinet - NOT MET  3. Pt has 4/5 UE R strength or better for chores around the house and ADLs by D/C - NOT MET  4. Pt to sleep with 75% less interference from R shoulder pain by D/C-MET  5. Pt reports readiness for DC to Independent HEP for self management of condition. - NOT MET    Plan:  Pt to MD on Friday.            Timed:         Manual Therapy:         mins  10862;     Therapeutic Exercise:    30     mins  22443;     Neuromuscular Edwardo:        mins  98215;    Therapeutic Activity:          mins  17372;     Gait Training:           mins  71606;     Ultrasound:          mins  91791;    Ionto                                   mins   83827  Self Care                            mins   58902  Canalith Repos                   mins  4209  Aquatic                               mins 55383    Un-Timed:  Electrical Stimulation:    15     mins  20293 ( );  Dry Needling          mins self-pay  Traction          mins 12229  Low Eval          Mins  62043  Mod Eval          Mins  27784  High Eval                            Mins  73848  Re-Eval                               mins  32807    Timed Treatment:   30   mins   Total Treatment:     45   mins    Chrystal Koroma  PTMALENA  IN [place]: [id number]

## 2024-01-30 NOTE — PROGRESS NOTES
"Physical Therapy Treatment Note    [address] [facility] [place] [address]  [phone] (p)  [phone] (f)    VISIT#: [id number]     Diagnosis Plan   1. Closed 3-part fracture of surgical neck of right humerus, initial encounter          Subjective   Caritojane Anderson reports: Neck tight and stiff, slept wrong. Shoulder 4/10. Doing fine with Hep, taking out wall slides helped. Still has not reached goal of being able to fix her hair in ponytail independently. She is frustrated with slow healing of bone and lack of functional improvement of her arm, plans to discuss with MD on 2/13/24    Objective     See Exercise, Manual, and Modality Logs for complete treatment.     Patient Education: reviewed posterior capsule stretch.     Quick DASH: 72% dysfunction, 56% work subscale    R shoulder PROM  Flex 115  Abd 90  IR 65  ER 85  ER and IR @ 90 not measured  Scaption 115    AROM   Flex 85,   ER back of head  IR L5  Abd 60     UE strength: 4-/5 IR and ER and ext, 3/5 flex and abd, elbow flex and ext 4-/5 all painfree.     Restriction posterior and inferior capsule. Tight lateral border scap/teres/lat.    Access Code: [email or web address]  URL: [email or web address]  Date: 01/31/2024  Prepared by: Chrystal Koroma    Exercises  - Sidelying Shoulder External Rotation Dumbbell  - 1 x daily - 7 x weekly - 2 sets - 10 reps  - Supine Cross Body Shoulder Stretch  - 1 x daily - 7 x weekly - 3 sets - 1 reps - 20\" hold  - Supine Elbow Flexion Extension AROM  - 1 x daily - 7 x weekly - 3 sets - 1 reps - 20\" hold  - Supine Chest Stretch with Elbows Bent  - 1 x daily - 7 x weekly - 3 sets - 10 reps  -Self release lateral border scap with tennis ball, supine    Assessment/Plan Pt with mild decrease in PROM flex and abd, mild increase IR and ER. Mild improvement in flexion AROM, otherwise no improvement. Mild improvement in strength, painfree resisted testing. Pt rated quick DASH worse today, pt reports previously took both arms into account.     Plan " Goals: SHORT TERM GOALS:   1. Patient to be compliant with their initial HEP in 2 weeks - MET  2. Patient Independent with AAROM shoulder ex. Pulley or cane in 2 weeks - MET  3. Patient has close to full PROM R Shoulder in 5 weeks - PARTIALLY MET  4. Patient to exhibit active shoulder flexion/abduction R AROM 100 or better to assist with overhead activities without pain in 5 weeks - NOT MET  5. IR R AROM to L1 R shoulder for LE dressing in 5 weeks - PARTIALLY MET  6. ER R AROM behind head for fixing and washing hair independently in 5 weeks- PARTIALLY MET     LONG TERM GOALS:   1. Pt score < 15 % perceived disability on DASH by D/C - PARTIALLY MET  2. Pt has functional R AROM shoulder flexion/abduction 130 without pain for reaching to cabinet - NOT MET  3. Pt has 4/5 UE R strength or better for chores around the house and ADLs by D/C - NOT MET  4. Pt to sleep with 75% less interference from R shoulder pain by D/C-MET  5. Pt reports readiness for DC to Independent HEP for self management of condition. - NOT MET    Plan:  Pt to MD on 2/13/24            Timed:         Manual Therapy:         mins  79514;     Therapeutic Exercise:    30     mins  46990;     Neuromuscular Edwardo:        mins  45364;    Therapeutic Activity:          mins  38881;     Gait Training:           mins  82230;     Ultrasound:          mins  17959;    Ionto                                   mins   75085  Self Care                            mins   85260  Canalith Repos                   mins  4209  Aquatic                               mins 03848    Un-Timed:  Electrical Stimulation:    15     mins  78290 ( );  Dry Needling          mins self-pay  Traction          mins 66142  Low Eval          Mins  85451  Mod Eval          Mins  95423  High Eval                            Mins  48850  Re-Eval                               mins  88174    Timed Treatment:   30   mins   Total Treatment:     45   mins    Chrystal Koroma PT, DPT  IN [place]:  [id number]

## 2024-02-12 ENCOUNTER — OFFICE VISIT (OUTPATIENT)
Dept: ORTHOPEDIC SURGERY | Facility: CLINIC | Age: 69
End: 2024-02-12
Payer: MEDICARE

## 2024-02-12 VITALS — WEIGHT: 200 LBS | OXYGEN SATURATION: 98 % | HEIGHT: 60 IN | HEART RATE: 83 BPM | BODY MASS INDEX: 39.27 KG/M2

## 2024-02-12 DIAGNOSIS — S42.231K: Primary | ICD-10-CM

## 2024-02-12 PROCEDURE — 99214 OFFICE O/P EST MOD 30 MIN: CPT | Performed by: FAMILY MEDICINE

## 2024-03-15 ENCOUNTER — DOCUMENTATION (OUTPATIENT)
Dept: PHYSICAL THERAPY | Facility: CLINIC | Age: 69
End: 2024-03-15
Payer: MEDICARE

## 2024-03-15 NOTE — PROGRESS NOTES
Discharge Summary  Discharge Summary from Physical Therapy Report    Carito Anderson  [birth year]    Dates  PT visit: 9/20/23 to 1/30/24  Number of Visits: 17     Discharge Status of Patient: Pt reached plateau in therapy, end of insurance auth. Referred pt back to MD due to pain and limited PROM and AROM and remaining functional impairments. She followed up with MD, he is pleased with progress though still non-union, soft tissue callus present. Provided topical combine pain cream for pain relief, follow up as needed.    Goals: Partially Met    Discharge Plan: Continue with current home exercise program as instructed  Patient to return to referring/providing physician    Date of Discharge 3/15/24      Chrystal Koroma, PT, DPT  Physical Therapist

## 2024-03-15 NOTE — LETTER
Discharge Summary  Discharge Summary from Physical Therapy Report    Carito Anderson  [birth year]    Dates  PT visit: 9/20/23 to 1/30/24  Number of Visits: 17     Discharge Status of Patient: Pt reached plateau in therapy, end of insurance auth. Referred pt back to MD due to pain and limited PROM and AROM and remaining functional impairments. She followed up with MD, he is pleased with progress though still non-union, soft tissue callus present. Provided topical combine pain cream for pain relief, follow up as needed.    Goals: Partially Met    Discharge Plan: Continue with current home exercise program as instructed  Patient to return to referring/providing physician    Date of Discharge 3/15/24      Chrystal Koroma, PT, DPT  Physical Therapist

## (undated) DEVICE — UNDYED BRAIDED (POLYGLACTIN 910), SYNTHETIC ABSORBABLE SUTURE: Brand: COATED VICRYL

## (undated) DEVICE — DECANTER: Brand: UNBRANDED

## (undated) DEVICE — SOL IRRIG H2O 1000ML STRL

## (undated) DEVICE — ADHS LIQ MASTISOL 2/3ML

## (undated) DEVICE — ELECTRD BLD EZ CLN MOD XLNG 2.75IN

## (undated) DEVICE — SLV SCD CALF HEMOFORCE DVT THERP REPROC MD

## (undated) DEVICE — GOWN,REINFORCE,POLY,SIRUS,BREATH SLV,XLG: Brand: MEDLINE

## (undated) DEVICE — 3M™ STERI-STRIP™ REINFORCED ADHESIVE SKIN CLOSURES, R1547, 1/2 IN X 4 IN (12 MM X 100 MM), 6 STRIPS/ENVELOPE: Brand: 3M™ STERI-STRIP™

## (undated) DEVICE — DRSNG SURESITE WNDW 4X4.5

## (undated) DEVICE — PK MINOR LAPAROTOMY 50

## (undated) DEVICE — SUT VIC 3/0 SH 27IN J416H

## (undated) DEVICE — KT SURG TURNOVER 050

## (undated) DEVICE — GLV SURG BIOGEL SENSR LTX PF SZ7.5